# Patient Record
Sex: MALE
[De-identification: names, ages, dates, MRNs, and addresses within clinical notes are randomized per-mention and may not be internally consistent; named-entity substitution may affect disease eponyms.]

---

## 2018-02-17 ENCOUNTER — HOSPITAL (OUTPATIENT)
Dept: OTHER | Age: 58
End: 2018-02-17
Attending: INTERNAL MEDICINE

## 2018-02-17 LAB
A/G RATIO_: 0.9
ABS LYMPH: 2 K/CUMM (ref 1–3.5)
ABS MONO: 0.4 K/CUMM (ref 0.1–0.8)
ABS NEUTRO: 2.6 K/CUMM (ref 2–8)
ALBUMIN: 3.5 G/DL (ref 3.5–5)
ALK PHOS: 54 UNIT/L (ref 50–124)
ALT/GPT: 34 UNIT/L (ref 0–55)
ANION GAP SERPL CALC-SCNC: 13 MEQ/L (ref 10–20)
APTT PPP: <21 SECONDS (ref 22–30)
AST/GOT: 36 UNIT/L (ref 5–34)
BASOPHIL: 1 % (ref 0–1)
BILI TOTAL: <0.1 MG/DL (ref 0.2–1)
BUN SERPL-MCNC: 14 MG/DL (ref 6–20)
CALCIUM: 9.3 MG/DL (ref 8.4–10.2)
CHLORIDE: 110 MEQ/L (ref 97–107)
CREATININE: 1.18 MG/DL (ref 0.6–1.3)
DIFF_TYPE?: NORMAL
EOSINOPHIL: 3 % (ref 0–6)
GLOBULIN_: 3.9 G/DL (ref 2–4.1)
GLUCOSE LVL: 137 MG/DL (ref 70–99)
HCT VFR BLD CALC: 43 % (ref 36–51)
HEMOLYSIS 2+: ABNORMAL
HEMOLYSIS 4+: NORMAL
HGB BLD-MCNC: 14.3 G/DL (ref 12–17)
ICTERIC 4+: NEGATIVE
IMMATURE GRAN: 0.2 % (ref 0–0.3)
INR: 1 (ref 0.9–1.1)
INSTR WBC: 5.2 K/CUMM (ref 4–11)
LIPEMIC 3+: NEGATIVE
LYMPHOCYTE: 39 %
MCH RBC QN AUTO: 30 PG (ref 25–35)
MCHC RBC AUTO-ENTMCNC: 33 G/DL (ref 32–37)
MCV RBC AUTO: 91 FL (ref 78–97)
MONOCYTE: 8 %
NEUTROPHIL: 49 %
NRBC BLD MANUAL-RTO: 0 % (ref 0–0.2)
PLATELET: 237 K/CUMM (ref 150–450)
POTASSIUM: 4.6 MEQ/L (ref 3.5–5.1)
PROTHROMBIN TIME: 10.7 SECONDS (ref 9.7–11.6)
RBC # BLD: 4.78 M/CUMM (ref 4.2–6)
RDW: 12.5 % (ref 11.5–14.5)
SODIUM: 142 MEQ/L (ref 136–145)
TCO2: 24 MEQ/L (ref 19–29)
TOTAL PROTEIN: 7.4 G/DL (ref 6.4–8.3)
TROPONIN I: 0.05 NG/ML
WBC # BLD: 5.2 K/CUMM (ref 4–11)

## 2018-02-18 LAB
ABS LYMPH: 1.8 K/CUMM (ref 1–3.5)
ABS MONO: 1.4 K/CUMM (ref 0.1–0.8)
ABS NEUTRO: 12 K/CUMM (ref 2–8)
ANION GAP SERPL CALC-SCNC: 12 MEQ/L (ref 10–20)
BASOPHIL: 0 % (ref 0–1)
BUN SERPL-MCNC: 16 MG/DL (ref 6–20)
CALCIUM: 8.8 MG/DL (ref 8.4–10.2)
CHLORIDE: 107 MEQ/L (ref 97–107)
CREATININE: 0.83 MG/DL (ref 0.6–1.3)
DIFF_TYPE?: ABNORMAL
EOSINOPHIL: 0 % (ref 0–6)
GLUCOSE LVL: 119 MG/DL (ref 70–99)
HCT VFR BLD CALC: 41 % (ref 36–51)
HEMOLYSIS 2+: NEGATIVE
HEMOLYSIS 4+: NEGATIVE
HGB BLD-MCNC: 13.6 G/DL (ref 12–17)
ICTERIC 4+: NEGATIVE
IMMATURE GRAN: 0.4 % (ref 0–0.3)
INSTR WBC: 15.3 K/CUMM (ref 4–11)
LYMPHOCYTE: 12 %
MCH RBC QN AUTO: 29 PG (ref 25–35)
MCHC RBC AUTO-ENTMCNC: 33 G/DL (ref 32–37)
MCV RBC AUTO: 88 FL (ref 78–97)
MONOCYTE: 9 %
NEUTROPHIL: 78 %
NRBC BLD MANUAL-RTO: 0 % (ref 0–0.2)
PLATELET: 226 K/CUMM (ref 150–450)
POTASSIUM: 3.7 MEQ/L (ref 3.5–5.1)
RBC # BLD: 4.63 M/CUMM (ref 4.2–6)
RDW: 12.9 % (ref 11.5–14.5)
SODIUM: 138 MEQ/L (ref 136–145)
TCO2: 23 MEQ/L (ref 19–29)
TROPONIN I: 40.92 NG/ML
WBC # BLD: 15.3 K/CUMM (ref 4–11)

## 2018-02-19 LAB
ABS LYMPH: 2 K/CUMM (ref 1–3.5)
ABS MONO: 1.4 K/CUMM (ref 0.1–0.8)
ABS NEUTRO: 7.9 K/CUMM (ref 2–8)
BASOPHIL: 0 % (ref 0–1)
CONTROL LINE: PRESENT
DIFF_TYPE?: ABNORMAL
EOSINOPHIL: 1 % (ref 0–6)
HCT VFR BLD CALC: 40 % (ref 36–51)
HEMOLYSIS 4+: NEGATIVE
HGB BLD-MCNC: 13.5 G/DL (ref 12–17)
ICTERIC 4+: NEGATIVE
IMMATURE GRAN: 0.3 % (ref 0–0.3)
INFLUENZ A: NORMAL
INFLUENZ B: NORMAL
INFLUENZ COMMNT: NORMAL
INSTR WBC: 11.5 K/CUMM (ref 4–11)
LYMPHOCYTE: 17 %
MCH RBC QN AUTO: 30 PG (ref 25–35)
MCHC RBC AUTO-ENTMCNC: 34 G/DL (ref 32–37)
MCV RBC AUTO: 88 FL (ref 78–97)
MONOCYTE: 12 %
NEUTROPHIL: 68 %
NRBC BLD MANUAL-RTO: 0 % (ref 0–0.2)
PLATELET: 201 K/CUMM (ref 150–450)
RBC # BLD: 4.54 M/CUMM (ref 4.2–6)
RDW: 12.6 % (ref 11.5–14.5)
TROPONIN I: 25.94 NG/ML
UA APPEAR: CLEAR
UA BILI: NEGATIVE
UA BLOOD: NEGATIVE
UA COLOR: YELLOW
UA GLUCOSE: NEGATIVE
UA KETONES: NEGATIVE
UA LEUK EST: NEGATIVE
UA NITRITE: NEGATIVE
UA PH: 7 (ref 5–7)
UA PROTEIN: NEGATIVE
UA SPEC GRAV: <=1.005 (ref 1.01–1.02)
UA UROBILINOGEN: 0.2 MG/DL (ref 0.2–1)
WBC # BLD: 11.5 K/CUMM (ref 4–11)

## 2018-03-16 ENCOUNTER — HOSPITAL ENCOUNTER (OUTPATIENT)
Dept: CARDIAC REHAB | Age: 58
Setting detail: THERAPIES SERIES
Discharge: HOME | End: 2018-03-16
Payer: COMMERCIAL

## 2018-03-16 VITALS — HEIGHT: 70 IN | WEIGHT: 184.5 LBS | BODY MASS INDEX: 26.41 KG/M2

## 2018-03-16 PROCEDURE — 93798 PHYS/QHP OP CAR RHAB W/ECG: CPT

## 2018-03-16 RX ORDER — LISINOPRIL 2.5 MG/1
2.5 TABLET ORAL DAILY
COMMUNITY
End: 2019-09-24 | Stop reason: SDUPTHER

## 2018-03-16 RX ORDER — CARVEDILOL 3.12 MG/1
6.25 TABLET ORAL 2 TIMES DAILY WITH MEALS
COMMUNITY
End: 2019-09-24 | Stop reason: SDUPTHER

## 2018-03-16 ASSESSMENT — PATIENT HEALTH QUESTIONNAIRE - PHQ9: SUM OF ALL RESPONSES TO PHQ QUESTIONS 1-9: 0

## 2018-03-19 ENCOUNTER — HOSPITAL ENCOUNTER (OUTPATIENT)
Dept: CARDIAC REHAB | Age: 58
Setting detail: THERAPIES SERIES
Discharge: HOME | End: 2018-03-19
Payer: COMMERCIAL

## 2018-03-19 VITALS — BODY MASS INDEX: 26.49 KG/M2 | WEIGHT: 184.6 LBS

## 2018-03-19 PROCEDURE — 93798 PHYS/QHP OP CAR RHAB W/ECG: CPT

## 2018-03-21 ENCOUNTER — HOSPITAL ENCOUNTER (OUTPATIENT)
Dept: CARDIAC REHAB | Age: 58
Setting detail: THERAPIES SERIES
Discharge: HOME | End: 2018-03-21
Payer: COMMERCIAL

## 2018-03-21 VITALS — BODY MASS INDEX: 26.36 KG/M2 | WEIGHT: 183.7 LBS

## 2018-03-21 PROCEDURE — 93798 PHYS/QHP OP CAR RHAB W/ECG: CPT

## 2018-03-23 ENCOUNTER — HOSPITAL ENCOUNTER (OUTPATIENT)
Dept: CARDIAC REHAB | Age: 58
Setting detail: THERAPIES SERIES
Discharge: HOME | End: 2018-03-23
Payer: COMMERCIAL

## 2018-03-23 VITALS — WEIGHT: 182.8 LBS | BODY MASS INDEX: 26.23 KG/M2

## 2018-03-23 PROCEDURE — 93798 PHYS/QHP OP CAR RHAB W/ECG: CPT

## 2018-03-26 ENCOUNTER — APPOINTMENT (OUTPATIENT)
Dept: CARDIAC REHAB | Age: 58
End: 2018-03-26
Payer: COMMERCIAL

## 2018-03-28 ENCOUNTER — APPOINTMENT (OUTPATIENT)
Dept: CARDIAC REHAB | Age: 58
End: 2018-03-28
Payer: COMMERCIAL

## 2018-04-02 ENCOUNTER — HOSPITAL ENCOUNTER (OUTPATIENT)
Dept: CARDIAC REHAB | Age: 58
Setting detail: THERAPIES SERIES
Discharge: HOME | End: 2018-04-02
Payer: COMMERCIAL

## 2018-04-04 ENCOUNTER — HOSPITAL ENCOUNTER (OUTPATIENT)
Dept: CARDIAC REHAB | Age: 58
Setting detail: THERAPIES SERIES
Discharge: HOME | End: 2018-04-04
Payer: COMMERCIAL

## 2018-04-04 VITALS — WEIGHT: 183.7 LBS | BODY MASS INDEX: 26.36 KG/M2

## 2018-04-04 PROCEDURE — 93798 PHYS/QHP OP CAR RHAB W/ECG: CPT

## 2018-04-06 ENCOUNTER — HOSPITAL ENCOUNTER (OUTPATIENT)
Dept: CARDIAC REHAB | Age: 58
Setting detail: THERAPIES SERIES
Discharge: HOME | End: 2018-04-06
Payer: COMMERCIAL

## 2018-04-06 VITALS — WEIGHT: 183 LBS | BODY MASS INDEX: 26.26 KG/M2

## 2018-04-06 PROCEDURE — 93798 PHYS/QHP OP CAR RHAB W/ECG: CPT

## 2018-04-09 ENCOUNTER — HOSPITAL ENCOUNTER (OUTPATIENT)
Dept: CARDIAC REHAB | Age: 58
Setting detail: THERAPIES SERIES
Discharge: HOME | End: 2018-04-09
Payer: COMMERCIAL

## 2018-04-11 ENCOUNTER — HOSPITAL ENCOUNTER (OUTPATIENT)
Dept: CARDIAC REHAB | Age: 58
Setting detail: THERAPIES SERIES
Discharge: HOME | End: 2018-04-11
Payer: COMMERCIAL

## 2018-04-11 VITALS — BODY MASS INDEX: 26.11 KG/M2 | WEIGHT: 182 LBS

## 2018-04-11 PROCEDURE — 93798 PHYS/QHP OP CAR RHAB W/ECG: CPT

## 2018-04-13 ENCOUNTER — HOSPITAL ENCOUNTER (OUTPATIENT)
Dept: CARDIAC REHAB | Age: 58
Setting detail: THERAPIES SERIES
Discharge: HOME | End: 2018-04-13
Payer: COMMERCIAL

## 2018-04-13 VITALS — BODY MASS INDEX: 26.3 KG/M2 | WEIGHT: 183.3 LBS

## 2018-04-13 PROCEDURE — 93798 PHYS/QHP OP CAR RHAB W/ECG: CPT

## 2018-04-16 ENCOUNTER — HOSPITAL ENCOUNTER (OUTPATIENT)
Dept: CARDIAC REHAB | Age: 58
Setting detail: THERAPIES SERIES
Discharge: HOME | End: 2018-04-16
Payer: COMMERCIAL

## 2018-04-16 VITALS — WEIGHT: 183 LBS | BODY MASS INDEX: 26.2 KG/M2 | HEIGHT: 70 IN

## 2018-04-16 PROCEDURE — 93798 PHYS/QHP OP CAR RHAB W/ECG: CPT

## 2018-04-18 ENCOUNTER — HOSPITAL ENCOUNTER (OUTPATIENT)
Dept: CARDIAC REHAB | Age: 58
Setting detail: THERAPIES SERIES
Discharge: HOME | End: 2018-04-18
Payer: COMMERCIAL

## 2018-04-18 VITALS — WEIGHT: 184.6 LBS | BODY MASS INDEX: 26.49 KG/M2

## 2018-04-18 PROCEDURE — 93798 PHYS/QHP OP CAR RHAB W/ECG: CPT

## 2018-04-20 ENCOUNTER — HOSPITAL ENCOUNTER (OUTPATIENT)
Dept: CARDIAC REHAB | Age: 58
Setting detail: THERAPIES SERIES
Discharge: HOME | End: 2018-04-20
Payer: COMMERCIAL

## 2018-04-20 VITALS — WEIGHT: 182.3 LBS | BODY MASS INDEX: 26.16 KG/M2

## 2018-04-20 PROCEDURE — 93798 PHYS/QHP OP CAR RHAB W/ECG: CPT

## 2018-04-23 ENCOUNTER — HOSPITAL ENCOUNTER (OUTPATIENT)
Dept: CARDIAC REHAB | Age: 58
Setting detail: THERAPIES SERIES
Discharge: HOME | End: 2018-04-23
Payer: COMMERCIAL

## 2018-04-23 VITALS — WEIGHT: 185 LBS | BODY MASS INDEX: 26.54 KG/M2

## 2018-04-23 PROCEDURE — 93798 PHYS/QHP OP CAR RHAB W/ECG: CPT

## 2018-04-25 ENCOUNTER — HOSPITAL ENCOUNTER (OUTPATIENT)
Dept: CARDIAC REHAB | Age: 58
Setting detail: THERAPIES SERIES
Discharge: HOME | End: 2018-04-25
Payer: COMMERCIAL

## 2018-04-25 VITALS — BODY MASS INDEX: 26.36 KG/M2 | WEIGHT: 183.7 LBS

## 2018-04-25 PROCEDURE — 93798 PHYS/QHP OP CAR RHAB W/ECG: CPT

## 2018-04-27 ENCOUNTER — HOSPITAL ENCOUNTER (OUTPATIENT)
Dept: CARDIAC REHAB | Age: 58
Setting detail: THERAPIES SERIES
Discharge: HOME | End: 2018-04-27
Payer: COMMERCIAL

## 2018-04-27 VITALS — WEIGHT: 184.2 LBS | BODY MASS INDEX: 26.43 KG/M2

## 2018-04-27 PROCEDURE — 93798 PHYS/QHP OP CAR RHAB W/ECG: CPT

## 2018-04-30 ENCOUNTER — HOSPITAL ENCOUNTER (OUTPATIENT)
Dept: CARDIAC REHAB | Age: 58
Setting detail: THERAPIES SERIES
Discharge: HOME | End: 2018-04-30
Payer: COMMERCIAL

## 2018-04-30 VITALS — BODY MASS INDEX: 26.4 KG/M2 | WEIGHT: 184 LBS

## 2018-04-30 PROCEDURE — 93798 PHYS/QHP OP CAR RHAB W/ECG: CPT

## 2018-05-02 ENCOUNTER — HOSPITAL ENCOUNTER (OUTPATIENT)
Dept: CARDIAC REHAB | Age: 58
Setting detail: THERAPIES SERIES
Discharge: HOME | End: 2018-05-02
Payer: COMMERCIAL

## 2018-05-02 VITALS — WEIGHT: 179.9 LBS | BODY MASS INDEX: 25.81 KG/M2

## 2018-05-02 PROCEDURE — 93798 PHYS/QHP OP CAR RHAB W/ECG: CPT

## 2018-05-04 ENCOUNTER — HOSPITAL ENCOUNTER (OUTPATIENT)
Dept: CARDIAC REHAB | Age: 58
Setting detail: THERAPIES SERIES
Discharge: HOME | End: 2018-05-04
Payer: COMMERCIAL

## 2018-05-04 VITALS — WEIGHT: 181.8 LBS | BODY MASS INDEX: 26.09 KG/M2

## 2018-05-04 PROCEDURE — 93798 PHYS/QHP OP CAR RHAB W/ECG: CPT

## 2018-05-07 ENCOUNTER — HOSPITAL ENCOUNTER (OUTPATIENT)
Dept: CARDIAC REHAB | Age: 58
Setting detail: THERAPIES SERIES
Discharge: HOME | End: 2018-05-07
Payer: COMMERCIAL

## 2018-05-09 ENCOUNTER — HOSPITAL ENCOUNTER (OUTPATIENT)
Dept: CARDIAC REHAB | Age: 58
Setting detail: THERAPIES SERIES
Discharge: HOME | End: 2018-05-09
Payer: COMMERCIAL

## 2018-05-09 VITALS — BODY MASS INDEX: 25.9 KG/M2 | WEIGHT: 180.5 LBS

## 2018-05-09 PROCEDURE — 93798 PHYS/QHP OP CAR RHAB W/ECG: CPT

## 2018-05-11 ENCOUNTER — HOSPITAL ENCOUNTER (OUTPATIENT)
Dept: CARDIAC REHAB | Age: 58
Setting detail: THERAPIES SERIES
Discharge: HOME | End: 2018-05-11
Payer: COMMERCIAL

## 2018-05-11 VITALS — BODY MASS INDEX: 26.11 KG/M2 | WEIGHT: 182 LBS

## 2018-05-11 PROCEDURE — 93798 PHYS/QHP OP CAR RHAB W/ECG: CPT

## 2018-05-14 ENCOUNTER — HOSPITAL ENCOUNTER (OUTPATIENT)
Dept: CARDIAC REHAB | Age: 58
Setting detail: THERAPIES SERIES
Discharge: HOME | End: 2018-05-14
Payer: COMMERCIAL

## 2018-05-14 VITALS — BODY MASS INDEX: 25.88 KG/M2 | WEIGHT: 180.4 LBS

## 2018-05-14 PROCEDURE — 93798 PHYS/QHP OP CAR RHAB W/ECG: CPT

## 2018-05-16 ENCOUNTER — HOSPITAL ENCOUNTER (OUTPATIENT)
Dept: CARDIAC REHAB | Age: 58
Setting detail: THERAPIES SERIES
Discharge: HOME | End: 2018-05-16
Payer: COMMERCIAL

## 2018-05-18 ENCOUNTER — HOSPITAL ENCOUNTER (OUTPATIENT)
Dept: CARDIAC REHAB | Age: 58
Setting detail: THERAPIES SERIES
Discharge: HOME | End: 2018-05-18
Payer: COMMERCIAL

## 2018-05-18 VITALS — WEIGHT: 178.9 LBS | BODY MASS INDEX: 25.61 KG/M2 | HEIGHT: 70 IN

## 2018-05-18 PROCEDURE — 93798 PHYS/QHP OP CAR RHAB W/ECG: CPT

## 2018-05-21 ENCOUNTER — HOSPITAL ENCOUNTER (OUTPATIENT)
Dept: CARDIAC REHAB | Age: 58
Setting detail: THERAPIES SERIES
Discharge: HOME | End: 2018-05-21
Payer: COMMERCIAL

## 2018-05-21 VITALS — WEIGHT: 179.8 LBS | BODY MASS INDEX: 25.8 KG/M2

## 2018-05-21 PROCEDURE — 93798 PHYS/QHP OP CAR RHAB W/ECG: CPT

## 2018-05-23 ENCOUNTER — HOSPITAL ENCOUNTER (OUTPATIENT)
Dept: CARDIAC REHAB | Age: 58
Setting detail: THERAPIES SERIES
Discharge: HOME | End: 2018-05-23
Payer: COMMERCIAL

## 2018-05-23 VITALS — WEIGHT: 177.5 LBS | BODY MASS INDEX: 25.47 KG/M2

## 2018-05-23 PROCEDURE — 93798 PHYS/QHP OP CAR RHAB W/ECG: CPT

## 2018-05-25 ENCOUNTER — HOSPITAL ENCOUNTER (OUTPATIENT)
Dept: CARDIAC REHAB | Age: 58
Setting detail: THERAPIES SERIES
Discharge: HOME | End: 2018-05-25
Payer: COMMERCIAL

## 2018-05-25 VITALS — BODY MASS INDEX: 25.78 KG/M2 | WEIGHT: 179.7 LBS

## 2018-05-25 PROCEDURE — 93798 PHYS/QHP OP CAR RHAB W/ECG: CPT

## 2018-05-30 ENCOUNTER — HOSPITAL ENCOUNTER (OUTPATIENT)
Dept: CARDIAC REHAB | Age: 58
Setting detail: THERAPIES SERIES
Discharge: HOME | End: 2018-05-30
Payer: COMMERCIAL

## 2018-05-30 VITALS — BODY MASS INDEX: 25.44 KG/M2 | WEIGHT: 177.3 LBS

## 2018-05-30 PROCEDURE — 93798 PHYS/QHP OP CAR RHAB W/ECG: CPT

## 2018-06-01 ENCOUNTER — HOSPITAL ENCOUNTER (OUTPATIENT)
Dept: CARDIAC REHAB | Age: 58
Setting detail: THERAPIES SERIES
Discharge: HOME | End: 2018-06-01
Payer: COMMERCIAL

## 2018-06-01 PROCEDURE — 93798 PHYS/QHP OP CAR RHAB W/ECG: CPT

## 2018-06-04 ENCOUNTER — APPOINTMENT (OUTPATIENT)
Dept: CARDIAC REHAB | Age: 58
End: 2018-06-04
Payer: COMMERCIAL

## 2018-06-06 ENCOUNTER — HOSPITAL ENCOUNTER (OUTPATIENT)
Dept: CARDIAC REHAB | Age: 58
Setting detail: THERAPIES SERIES
Discharge: HOME | End: 2018-06-06
Payer: COMMERCIAL

## 2018-06-08 ENCOUNTER — HOSPITAL ENCOUNTER (OUTPATIENT)
Dept: CARDIAC REHAB | Age: 58
Setting detail: THERAPIES SERIES
Discharge: HOME | End: 2018-06-08
Payer: COMMERCIAL

## 2018-06-08 VITALS — BODY MASS INDEX: 25.35 KG/M2 | WEIGHT: 177.1 LBS | HEIGHT: 70 IN

## 2018-06-11 ENCOUNTER — APPOINTMENT (OUTPATIENT)
Dept: CARDIAC REHAB | Age: 58
End: 2018-06-11
Payer: COMMERCIAL

## 2018-06-13 ENCOUNTER — APPOINTMENT (OUTPATIENT)
Dept: CARDIAC REHAB | Age: 58
End: 2018-06-13
Payer: COMMERCIAL

## 2018-06-15 ENCOUNTER — APPOINTMENT (OUTPATIENT)
Dept: CARDIAC REHAB | Age: 58
End: 2018-06-15
Payer: COMMERCIAL

## 2018-06-18 ENCOUNTER — APPOINTMENT (OUTPATIENT)
Dept: CARDIAC REHAB | Age: 58
End: 2018-06-18
Payer: COMMERCIAL

## 2018-06-20 ENCOUNTER — APPOINTMENT (OUTPATIENT)
Dept: CARDIAC REHAB | Age: 58
End: 2018-06-20
Payer: COMMERCIAL

## 2018-06-22 ENCOUNTER — APPOINTMENT (OUTPATIENT)
Dept: CARDIAC REHAB | Age: 58
End: 2018-06-22
Payer: COMMERCIAL

## 2018-06-25 ENCOUNTER — APPOINTMENT (OUTPATIENT)
Dept: CARDIAC REHAB | Age: 58
End: 2018-06-25
Payer: COMMERCIAL

## 2018-06-27 ENCOUNTER — APPOINTMENT (OUTPATIENT)
Dept: CARDIAC REHAB | Age: 58
End: 2018-06-27
Payer: COMMERCIAL

## 2018-06-29 ENCOUNTER — APPOINTMENT (OUTPATIENT)
Dept: CARDIAC REHAB | Age: 58
End: 2018-06-29
Payer: COMMERCIAL

## 2019-06-24 PROBLEM — I25.2 HISTORY OF HEART ATTACK: Status: ACTIVE | Noted: 2019-06-24

## 2019-06-24 PROBLEM — M10.00 IDIOPATHIC GOUT: Status: ACTIVE | Noted: 2019-06-24

## 2019-06-24 PROBLEM — E78.2 MIXED HYPERLIPIDEMIA: Status: ACTIVE | Noted: 2019-06-24

## 2019-06-24 PROBLEM — I10 ESSENTIAL HYPERTENSION: Status: ACTIVE | Noted: 2019-06-24

## 2019-06-24 PROBLEM — N40.0 BPH WITHOUT URINARY OBSTRUCTION: Status: ACTIVE | Noted: 2019-06-24

## 2019-06-24 PROBLEM — E55.9 VITAMIN D DEFICIENCY: Status: ACTIVE | Noted: 2019-06-24

## 2019-08-11 ENCOUNTER — NURSE TRIAGE (OUTPATIENT)
Dept: OTHER | Facility: CLINIC | Age: 59
End: 2019-08-11

## 2019-11-06 ENCOUNTER — HOSPITAL ENCOUNTER (OUTPATIENT)
Age: 59
Setting detail: SPECIMEN
Discharge: HOME | End: 2019-11-06
Payer: COMMERCIAL

## 2019-11-06 DIAGNOSIS — N42.9 PROSTATE DISORDER: ICD-10-CM

## 2019-11-06 DIAGNOSIS — R53.83 OTHER FATIGUE: ICD-10-CM

## 2019-11-06 LAB
ABSOLUTE EOS #: 0.15 K/UL (ref 0–0.44)
ABSOLUTE IMMATURE GRANULOCYTE: 0.03 K/UL (ref 0–0.3)
ABSOLUTE LYMPH #: 1.96 K/UL (ref 1.1–3.7)
ABSOLUTE MONO #: 0.55 K/UL (ref 0.1–1.2)
ALBUMIN SERPL-MCNC: 4.4 G/DL (ref 3.5–5.2)
ALBUMIN/GLOBULIN RATIO: 1.3 (ref 1–2.5)
ALP BLD-CCNC: 94 U/L (ref 40–129)
ALT SERPL-CCNC: 25 U/L (ref 5–41)
ANION GAP SERPL CALCULATED.3IONS-SCNC: 11 MMOL/L (ref 9–17)
AST SERPL-CCNC: 21 U/L
BASOPHILS # BLD: 1 % (ref 0–2)
BASOPHILS ABSOLUTE: 0.04 K/UL (ref 0–0.2)
BILIRUB SERPL-MCNC: 0.83 MG/DL (ref 0.3–1.2)
BUN BLDV-MCNC: 12 MG/DL (ref 6–20)
BUN/CREAT BLD: ABNORMAL (ref 9–20)
CALCIUM SERPL-MCNC: 9.7 MG/DL (ref 8.6–10.4)
CHLORIDE BLD-SCNC: 102 MMOL/L (ref 98–107)
CO2: 27 MMOL/L (ref 20–31)
CREAT SERPL-MCNC: 0.65 MG/DL (ref 0.7–1.2)
DIFFERENTIAL TYPE: ABNORMAL
EOSINOPHILS RELATIVE PERCENT: 2 % (ref 1–4)
GFR AFRICAN AMERICAN: >60 ML/MIN
GFR NON-AFRICAN AMERICAN: >60 ML/MIN
GFR SERPL CREATININE-BSD FRML MDRD: ABNORMAL ML/MIN/{1.73_M2}
GFR SERPL CREATININE-BSD FRML MDRD: ABNORMAL ML/MIN/{1.73_M2}
GLUCOSE FASTING: 96 MG/DL (ref 70–99)
HCT VFR BLD CALC: 51.2 % (ref 40.7–50.3)
HEMOGLOBIN: 16.7 G/DL (ref 13–17)
IMMATURE GRANULOCYTES: 1 %
LYMPHOCYTES # BLD: 30 % (ref 24–43)
MCH RBC QN AUTO: 30.3 PG (ref 25.2–33.5)
MCHC RBC AUTO-ENTMCNC: 32.6 G/DL (ref 28.4–34.8)
MCV RBC AUTO: 92.9 FL (ref 82.6–102.9)
MONOCYTES # BLD: 9 % (ref 3–12)
NRBC AUTOMATED: 0 PER 100 WBC
PDW BLD-RTO: 12.3 % (ref 11.8–14.4)
PLATELET # BLD: 235 K/UL (ref 138–453)
PLATELET ESTIMATE: ABNORMAL
PMV BLD AUTO: 10.8 FL (ref 8.1–13.5)
POTASSIUM SERPL-SCNC: 4.5 MMOL/L (ref 3.7–5.3)
PROSTATE SPECIFIC ANTIGEN: 0.8 UG/L
RBC # BLD: 5.51 M/UL (ref 4.21–5.77)
RBC # BLD: ABNORMAL 10*6/UL
SEG NEUTROPHILS: 58 % (ref 36–65)
SEGMENTED NEUTROPHILS ABSOLUTE COUNT: 3.73 K/UL (ref 1.5–8.1)
SODIUM BLD-SCNC: 140 MMOL/L (ref 135–144)
TOTAL PROTEIN: 7.7 G/DL (ref 6.4–8.3)
TSH SERPL DL<=0.05 MIU/L-ACNC: 1.23 MIU/L (ref 0.3–5)
VITAMIN B-12: 522 PG/ML (ref 232–1245)
VITAMIN D 25-HYDROXY: 51.3 NG/ML (ref 30–100)
WBC # BLD: 6.5 K/UL (ref 3.5–11.3)
WBC # BLD: ABNORMAL 10*3/UL

## 2020-08-20 ENCOUNTER — HOSPITAL ENCOUNTER (OUTPATIENT)
Age: 60
Setting detail: SPECIMEN
Discharge: HOME | End: 2020-08-20
Payer: COMMERCIAL

## 2020-08-20 LAB
CHOLESTEROL/HDL RATIO: 3.5
CHOLESTEROL: 136 MG/DL
HDLC SERPL-MCNC: 39 MG/DL
LDL CHOLESTEROL: 57 MG/DL (ref 0–130)
TRIGL SERPL-MCNC: 198 MG/DL
VLDLC SERPL CALC-MCNC: ABNORMAL MG/DL (ref 1–30)

## 2020-10-02 ENCOUNTER — HOSPITAL ENCOUNTER (OUTPATIENT)
Dept: PREADMISSION TESTING | Age: 60
Discharge: HOME | End: 2020-10-06
Payer: COMMERCIAL

## 2020-10-02 PROCEDURE — U0003 INFECTIOUS AGENT DETECTION BY NUCLEIC ACID (DNA OR RNA); SEVERE ACUTE RESPIRATORY SYNDROME CORONAVIRUS 2 (SARS-COV-2) (CORONAVIRUS DISEASE [COVID-19]), AMPLIFIED PROBE TECHNIQUE, MAKING USE OF HIGH THROUGHPUT TECHNOLOGIES AS DESCRIBED BY CMS-2020-01-R: HCPCS

## 2020-10-04 LAB — SARS-COV-2, NAA: NOT DETECTED

## 2020-10-06 ENCOUNTER — HOSPITAL ENCOUNTER (OUTPATIENT)
Dept: SLEEP CENTER | Age: 60
Discharge: HOME | End: 2020-10-08
Payer: COMMERCIAL

## 2020-10-06 PROCEDURE — 95810 POLYSOM 6/> YRS 4/> PARAM: CPT

## 2020-10-07 VITALS
TEMPERATURE: 96.4 F | BODY MASS INDEX: 26.63 KG/M2 | WEIGHT: 186 LBS | RESPIRATION RATE: 16 BRPM | HEART RATE: 56 BPM | HEIGHT: 70 IN

## 2020-10-27 LAB — STATUS: NORMAL

## 2020-10-29 ENCOUNTER — HOSPITAL ENCOUNTER (OUTPATIENT)
Dept: PREADMISSION TESTING | Age: 60
Discharge: HOME | End: 2020-11-02
Payer: COMMERCIAL

## 2020-10-29 PROCEDURE — U0003 INFECTIOUS AGENT DETECTION BY NUCLEIC ACID (DNA OR RNA); SEVERE ACUTE RESPIRATORY SYNDROME CORONAVIRUS 2 (SARS-COV-2) (CORONAVIRUS DISEASE [COVID-19]), AMPLIFIED PROBE TECHNIQUE, MAKING USE OF HIGH THROUGHPUT TECHNOLOGIES AS DESCRIBED BY CMS-2020-01-R: HCPCS

## 2020-10-31 LAB — SARS-COV-2, NAA: NOT DETECTED

## 2020-11-01 ENCOUNTER — HOSPITAL ENCOUNTER (OUTPATIENT)
Dept: SLEEP CENTER | Age: 60
Discharge: HOME | End: 2020-11-03
Payer: COMMERCIAL

## 2020-11-01 PROCEDURE — 95811 POLYSOM 6/>YRS CPAP 4/> PARM: CPT

## 2020-11-02 VITALS
OXYGEN SATURATION: 95 % | BODY MASS INDEX: 26.63 KG/M2 | WEIGHT: 186 LBS | TEMPERATURE: 98.3 F | RESPIRATION RATE: 16 BRPM | HEART RATE: 55 BPM | HEIGHT: 70 IN

## 2020-11-11 LAB — STATUS: NORMAL

## 2021-02-09 ENCOUNTER — HOSPITAL ENCOUNTER (OUTPATIENT)
Age: 61
Setting detail: SPECIMEN
Discharge: HOME | End: 2021-02-09
Payer: COMMERCIAL

## 2021-02-09 DIAGNOSIS — N42.9 PROSTATE DISORDER: ICD-10-CM

## 2021-02-09 DIAGNOSIS — R53.83 OTHER FATIGUE: ICD-10-CM

## 2021-02-09 DIAGNOSIS — M10.00 IDIOPATHIC GOUT, UNSPECIFIED CHRONICITY, UNSPECIFIED SITE: ICD-10-CM

## 2021-02-09 DIAGNOSIS — E78.2 MIXED HYPERLIPIDEMIA: ICD-10-CM

## 2021-02-09 DIAGNOSIS — R73.01 IFG (IMPAIRED FASTING GLUCOSE): ICD-10-CM

## 2021-02-09 LAB
ABSOLUTE EOS #: 0.14 K/UL (ref 0–0.44)
ABSOLUTE IMMATURE GRANULOCYTE: 0.04 K/UL (ref 0–0.3)
ABSOLUTE LYMPH #: 1.98 K/UL (ref 1.1–3.7)
ABSOLUTE MONO #: 0.72 K/UL (ref 0.1–1.2)
ALBUMIN SERPL-MCNC: 4.6 G/DL (ref 3.5–5.2)
ALBUMIN/GLOBULIN RATIO: 1.3 (ref 1–2.5)
ALP BLD-CCNC: 104 U/L (ref 40–129)
ALT SERPL-CCNC: 22 U/L (ref 5–41)
ANION GAP SERPL CALCULATED.3IONS-SCNC: 15 MMOL/L (ref 9–17)
AST SERPL-CCNC: 23 U/L
BASOPHILS # BLD: 0 % (ref 0–2)
BASOPHILS ABSOLUTE: <0.03 K/UL (ref 0–0.2)
BILIRUB SERPL-MCNC: 0.93 MG/DL (ref 0.3–1.2)
BUN BLDV-MCNC: 13 MG/DL (ref 6–20)
BUN/CREAT BLD: NORMAL (ref 9–20)
CALCIUM SERPL-MCNC: 9.5 MG/DL (ref 8.6–10.4)
CHLORIDE BLD-SCNC: 104 MMOL/L (ref 98–107)
CHOLESTEROL/HDL RATIO: 3.4
CHOLESTEROL: 154 MG/DL
CO2: 23 MMOL/L (ref 20–31)
CREAT SERPL-MCNC: 0.7 MG/DL (ref 0.7–1.2)
DIFFERENTIAL TYPE: ABNORMAL
EOSINOPHILS RELATIVE PERCENT: 2 % (ref 1–4)
ESTIMATED AVERAGE GLUCOSE: 105 MG/DL
GFR AFRICAN AMERICAN: >60 ML/MIN
GFR NON-AFRICAN AMERICAN: >60 ML/MIN
GFR SERPL CREATININE-BSD FRML MDRD: NORMAL ML/MIN/{1.73_M2}
GFR SERPL CREATININE-BSD FRML MDRD: NORMAL ML/MIN/{1.73_M2}
GLUCOSE FASTING: 90 MG/DL (ref 70–99)
HBA1C MFR BLD: 5.3 % (ref 4–6)
HCT VFR BLD CALC: 52.3 % (ref 40.7–50.3)
HDLC SERPL-MCNC: 45 MG/DL
HEMOGLOBIN: 16.5 G/DL (ref 13–17)
IMMATURE GRANULOCYTES: 1 %
LDL CHOLESTEROL: 65 MG/DL (ref 0–130)
LYMPHOCYTES # BLD: 28 % (ref 24–43)
MCH RBC QN AUTO: 29 PG (ref 25.2–33.5)
MCHC RBC AUTO-ENTMCNC: 31.5 G/DL (ref 28.4–34.8)
MCV RBC AUTO: 92.1 FL (ref 82.6–102.9)
MONOCYTES # BLD: 10 % (ref 3–12)
NRBC AUTOMATED: 0 PER 100 WBC
PDW BLD-RTO: 12.2 % (ref 11.8–14.4)
PLATELET # BLD: 248 K/UL (ref 138–453)
PLATELET ESTIMATE: ABNORMAL
PMV BLD AUTO: 11.1 FL (ref 8.1–13.5)
POTASSIUM SERPL-SCNC: 4.4 MMOL/L (ref 3.7–5.3)
PROSTATE SPECIFIC ANTIGEN: 0.84 UG/L
RBC # BLD: 5.68 M/UL (ref 4.21–5.77)
RBC # BLD: ABNORMAL 10*6/UL
SEG NEUTROPHILS: 59 % (ref 36–65)
SEGMENTED NEUTROPHILS ABSOLUTE COUNT: 4.14 K/UL (ref 1.5–8.1)
SODIUM BLD-SCNC: 142 MMOL/L (ref 135–144)
TOTAL PROTEIN: 8.1 G/DL (ref 6.4–8.3)
TRIGL SERPL-MCNC: 222 MG/DL
URIC ACID: 5.7 MG/DL (ref 3.4–7)
VITAMIN B-12: 503 PG/ML (ref 232–1245)
VITAMIN D 25-HYDROXY: 44 NG/ML (ref 30–100)
VLDLC SERPL CALC-MCNC: ABNORMAL MG/DL (ref 1–30)
WBC # BLD: 7 K/UL (ref 3.5–11.3)
WBC # BLD: ABNORMAL 10*3/UL

## 2021-03-25 ENCOUNTER — HOSPITAL ENCOUNTER (OUTPATIENT)
Dept: ULTRASOUND IMAGING | Facility: CLINIC | Age: 61
Discharge: HOME | End: 2021-03-27
Payer: COMMERCIAL

## 2021-03-25 DIAGNOSIS — N49.9: ICD-10-CM

## 2021-03-25 PROCEDURE — 93976 VASCULAR STUDY: CPT

## 2021-04-05 ENCOUNTER — HOSPITAL ENCOUNTER (OUTPATIENT)
Dept: GENERAL RADIOLOGY | Facility: CLINIC | Age: 61
Discharge: HOME | End: 2021-04-07
Payer: COMMERCIAL

## 2021-04-05 DIAGNOSIS — M54.10 BACK PAIN WITH RADICULOPATHY: ICD-10-CM

## 2021-04-05 PROCEDURE — 72100 X-RAY EXAM L-S SPINE 2/3 VWS: CPT

## 2021-05-10 ENCOUNTER — HOSPITAL ENCOUNTER (OUTPATIENT)
Dept: CARDIAC CATH/INVASIVE PROCEDURES | Age: 61
Discharge: HOME | End: 2021-05-10
Payer: COMMERCIAL

## 2021-05-10 VITALS
WEIGHT: 194 LBS | HEART RATE: 66 BPM | BODY MASS INDEX: 27.77 KG/M2 | OXYGEN SATURATION: 98 % | HEIGHT: 70 IN | RESPIRATION RATE: 13 BRPM | SYSTOLIC BLOOD PRESSURE: 109 MMHG | DIASTOLIC BLOOD PRESSURE: 77 MMHG | TEMPERATURE: 97.9 F

## 2021-05-10 LAB
GFR NON-AFRICAN AMERICAN: >60 ML/MIN
GFR SERPL CREATININE-BSD FRML MDRD: >60 ML/MIN
GFR SERPL CREATININE-BSD FRML MDRD: NORMAL ML/MIN/{1.73_M2}
GLUCOSE BLD-MCNC: 94 MG/DL (ref 74–100)
PLATELET # BLD: 196 K/UL (ref 138–453)
POC BUN: 13 MG/DL (ref 8–26)
POC CHLORIDE: 105 MMOL/L (ref 98–107)
POC CREATININE: 0.87 MG/DL (ref 0.51–1.19)
POC HEMATOCRIT: 48 % (ref 41–53)
POC HEMOGLOBIN: 16.4 G/DL (ref 13.5–17.5)
POC POTASSIUM: 4 MMOL/L (ref 3.5–4.5)
POC SODIUM: 142 MMOL/L (ref 138–146)

## 2021-05-10 PROCEDURE — 6360000002 HC RX W HCPCS

## 2021-05-10 PROCEDURE — 85049 AUTOMATED PLATELET COUNT: CPT

## 2021-05-10 PROCEDURE — 82565 ASSAY OF CREATININE: CPT

## 2021-05-10 PROCEDURE — C1887 CATHETER, GUIDING: HCPCS

## 2021-05-10 PROCEDURE — 7100000001 HC PACU RECOVERY - ADDTL 15 MIN

## 2021-05-10 PROCEDURE — 6360000004 HC RX CONTRAST MEDICATION

## 2021-05-10 PROCEDURE — 82435 ASSAY OF BLOOD CHLORIDE: CPT

## 2021-05-10 PROCEDURE — 7100000000 HC PACU RECOVERY - FIRST 15 MIN

## 2021-05-10 PROCEDURE — 82947 ASSAY GLUCOSE BLOOD QUANT: CPT

## 2021-05-10 PROCEDURE — 85014 HEMATOCRIT: CPT

## 2021-05-10 PROCEDURE — 84132 ASSAY OF SERUM POTASSIUM: CPT

## 2021-05-10 PROCEDURE — 93454 CORONARY ARTERY ANGIO S&I: CPT | Performed by: INTERNAL MEDICINE

## 2021-05-10 PROCEDURE — 2500000003 HC RX 250 WO HCPCS

## 2021-05-10 PROCEDURE — 84295 ASSAY OF SERUM SODIUM: CPT

## 2021-05-10 PROCEDURE — 2580000003 HC RX 258: Performed by: INTERNAL MEDICINE

## 2021-05-10 PROCEDURE — C1769 GUIDE WIRE: HCPCS

## 2021-05-10 PROCEDURE — 2709999900 HC NON-CHARGEABLE SUPPLY

## 2021-05-10 PROCEDURE — C1894 INTRO/SHEATH, NON-LASER: HCPCS

## 2021-05-10 PROCEDURE — 84520 ASSAY OF UREA NITROGEN: CPT

## 2021-05-10 RX ORDER — SODIUM CHLORIDE 0.9 % (FLUSH) 0.9 %
5-40 SYRINGE (ML) INJECTION EVERY 12 HOURS SCHEDULED
Status: DISCONTINUED | OUTPATIENT
Start: 2021-05-10 | End: 2021-05-11 | Stop reason: HOSPADM

## 2021-05-10 RX ORDER — ACETAMINOPHEN 325 MG/1
650 TABLET ORAL EVERY 4 HOURS PRN
Status: DISCONTINUED | OUTPATIENT
Start: 2021-05-10 | End: 2021-05-11 | Stop reason: HOSPADM

## 2021-05-10 RX ORDER — SODIUM CHLORIDE 9 MG/ML
25 INJECTION, SOLUTION INTRAVENOUS PRN
Status: DISCONTINUED | OUTPATIENT
Start: 2021-05-10 | End: 2021-05-11 | Stop reason: HOSPADM

## 2021-05-10 RX ORDER — SODIUM CHLORIDE 0.9 % (FLUSH) 0.9 %
5-40 SYRINGE (ML) INJECTION PRN
Status: DISCONTINUED | OUTPATIENT
Start: 2021-05-10 | End: 2021-05-11 | Stop reason: HOSPADM

## 2021-05-10 RX ORDER — SODIUM CHLORIDE 9 MG/ML
INJECTION, SOLUTION INTRAVENOUS CONTINUOUS
Status: DISCONTINUED | OUTPATIENT
Start: 2021-05-10 | End: 2021-05-11 | Stop reason: HOSPADM

## 2021-05-10 RX ADMIN — SODIUM CHLORIDE: 9 INJECTION, SOLUTION INTRAVENOUS at 09:04

## 2021-05-10 NOTE — H&P
[facility] Cardiology Cardiology    Consult / H&P               Today's Date: 5/10/2021  Patient Name: Lyndel Klinefelter  Date of admission: 5/10/2021  8:31 AM  Patient's age: 62 y.o., 1960  Admission Dx: Abnormal stress ECG [R94.39]    Reason for Admission / Consult: Abnormal stress test    Requesting Physician: No admitting provider for patient encounter. CHIEF COMPLAINT:  Chest discomfort and shortness of breath on exertion    History Obtained From:  patient,     HISTORY OF PRESENT ILLNESS:      The patient is a 62 y.o.  male with a history of CAD s/p TRISH to LAD in 2018, hypertension and hyperlipidemia who presents for elective cardiac cath. Patient was seen in clinic in March and reported occasional chest discomfort and shortness of breath on exertion. He also reported limitation in his activity levels due to Covid infection in the months preceding the clinic visit. A stress test was ordered at that visit which showed small to moderate apical infarction without any ischemia. Due to his recurrent symptoms, patient was scheduled for cardiac cath    Past Medical History:   has a past medical history of CAD (coronary artery disease), Cancer ([facility] [address] Enlarged prostate, Family history of heart attack, Gout, Heart attack ([facility] [address] High triglycerides, History of heart attack, Hyperlipidemia, Hypertension, and Sleep apnea. Past Surgical History:   has a past surgical history that includes Shoulder arthroscopy (Left, 1995 ?); Inguinal hernia repair (Bilateral, '85, '93); Knee arthroscopy (Right, 2012); Septoplasty (Bilateral, 01/16/2017); turbinate resection (Bilateral, 01/16/2017); Knee cartilage surgery (Left); shoulder surgery (Left); Inguinal hernia repair (Bilateral); Coronary angioplasty (2018); Cardiac catheterization (05/10/2021); and skin biopsy (Right). Home Medications:    Prior to Admission medications    Medication Sig Start Date End Date Taking?  Authorizing Provider   allopurinol (ZYLOPRIM) 100 MG tablet TAKE TWO TABLETS BY MOUTH EVERY DAY 4/3/21  Yes Arlene Schreiber MD   tamsulosin (FLOMAX) 0.4 MG capsule TAKE 1 CAPSULE BY MOUTH ONE TIME A DAY  Patient taking differently: Take 0.8 mg by mouth daily  3/8/21  Yes Arlene Schreiber MD   ticagrelor (BRILINTA) 60 MG TABS tablet Take 1 tablet by mouth 2 times daily 9/24/19  Yes Arlene Schreiber MD   lisinopril (PRINIVIL;ZESTRIL) 2.5 MG tablet Take 1.25 mg by mouth daily   Yes Historical Provider, MD   atorvastatin (LIPITOR) 80 MG tablet Take 40 mg by mouth daily   Yes Historical Provider, MD   carvedilol (COREG) 3.125 MG tablet Take 3.125 mg by mouth 2 times daily (with meals)   Yes Historical Provider, MD   Cholecalciferol (VITAMIN D3) 5000 units TABS Take by mouth   Yes Historical Provider, MD   vitamin C (ASCORBIC ACID) 500 MG tablet Take 500 mg by mouth daily   Yes Historical Provider, MD        Current Facility-Administered Medications: 0.9 % sodium chloride infusion, , Intravenous, Continuous    Allergies:  Morphine and Morphine    Social History:   reports that he has never smoked. He has never used smokeless tobacco. He reports current alcohol use. He reports that he does not use drugs. Family History: family history includes Cancer in his father and mother; Heart Attack in his father, mother, sister, and sister; Heart Disease in his brother, father, mother, sister, and sister; No Known Problems in his maternal grandfather, maternal grandmother, paternal grandfather, and paternal grandmother; Other in his maternal uncle and mother; Prostate Cancer in his brother and brother; Stroke in his father and sister; Uterine Cancer in his sister and sister. No h/o sudden cardiac death. No for premature CAD    REVIEW OF SYSTEMS:    · Constitutional: there has been no unanticipated weight loss. There's been No change in energy level, No change in activity level. · Eyes: No visual changes or diplopia. No scleral icterus.   · ENT: No Headaches  · Cardiovascular: Remaining as above  · Respiratory: No previous pulmonary problems, No cough  · Gastrointestinal: No abdominal pain. No change in bowel or bladder habits. · Genitourinary: No dysuria, trouble voiding, or hematuria. · Musculoskeletal:  No gait disturbance, No weakness or joint complaints. · Integumentary: No rash or pruritis. · Neurological: No headache, diplopia, change in muscle strength, numbness or tingling. No change in gait, balance, coordination, mood, affect, memory, mentation, behavior. · Psychiatric: No anxiety, or depression. · Endocrine: No temperature intolerance. No excessive thirst, fluid intake, or urination. No tremor. · Hematologic/Lymphatic: No abnormal bruising or bleeding, blood clots or swollen lymph nodes. · Allergic/Immunologic: No nasal congestion or hives. PHYSICAL EXAM:      /83   Pulse 62   Temp 97.9 °F (36.6 °C) (Oral)   Resp 19   Ht 5' 10\" (1.778 m)   Wt 194 lb (88 kg)   SpO2 97%   BMI 27.84 kg/m²    No intake or output data in the 24 hours ending 05/10/21 0903      Constitutional and General Appearance: alert, cooperative, no distress and appears stated age  HEENT: PERRL, no cervical lymphadenopathy. No masses palpable. Normal oral mucosa  Respiratory:  · Normal excursion and expansion without use of accessory muscles  · Resp Auscultation: Good respiratory effort. No for increased work of breathing. On auscultation: clear to auscultation bilaterally  Cardiovascular:  · The apical impulse is not displaced  · Heart tones are crisp and normal. regular S1 and S2.  · Jugular venous pulsation Normal  · The carotid upstroke is normal in amplitude and contour without delay or bruit  · Peripheral pulses are symmetrical and full     Abdomen:   · No masses or tenderness  · Bowel sounds present  Extremities:  ·  No Cyanosis or Clubbing  ·  Lower extremity edema: No  ·  Skin: Warm and dry  Neurological:  · Alert and oriented.   · Moves all extremities well  · No abnormalities of mood, affect, memory, mentation, or behavior are noted    DATA:    Diagnostics:      Stress Test: 3/4/21:  1. Apical small to moderate non transmural infarction  2. No ischemia  3. Reduced LV function with EF 48%       Cardiac Angiography: 2/22/18:   3.5 x 18 mm stent in proximal LAD, postdilated with 3.5 x 15 mm NC trek      Impression:  1. Successful PTCA stenting of the proximal and proximal/mid LAD with 2 drug-eluting stents  2. Reduced LVEF of approximately 35%,      Carotid study 4/5/2021:  1. Mild <50% stenosis of internal carotid arteries bilaterally. 2. Bilateral antegrade vertebral artery flow      MUGA scan: 2019  LVEF 56%  Labs:     CBC: No results for input(s): WBC, HGB, HCT, PLT in the last 72 hours. BMP: No results for input(s): NA, K, CO2, BUN, CREATININE, LABGLOM, GLUCOSE in the last 72 hours. Pro-BNP:  No results for input(s): PROBNP in the last 72 hours. BNP: No results for input(s): BNP in the last 72 hours. PT/INR: No results for input(s): PROTIME, INR in the last 72 hours. APTT:No results for input(s): APTT in the last 72 hours. CARDIAC ENZYMES:No results for input(s): CKTOTAL, CKMB, CKMBINDEX, TROPONINI in the last 72 hours. Invalid input(s):  TROPONINT  No results for input(s): TROPONINT in the last 72 hours. No results for input(s): TROPHS in the last 72 hours. FASTING LIPID PANEL:  Lab Results   Component Value Date    HDL 45 02/09/2021    LDLCALC 72 09/24/2019    TRIG 222 02/09/2021     LIVER PROFILE:No results for input(s): AST, ALT, LABALBU in the last 72 hours. Patient's Active Problem List  Active Problems:    * No active hospital problems. *  Resolved Problems:    * No resolved hospital problems. *        IMPRESSION:    1. Recurrent chest discomfort with recent stress test: Apical small to moderate non transmural infarction  2. S/P Acute MI , anterior wall Feb 17, 2018.  [place] heart cath 100% LAd, 50% RCA, required TRISH.  3. Previous Ischemic cardiomyopathy,EF 35% with recovered LVEF 56% in 2019  4. Dyslipidemia  5. Strong FH of CAD. Carolina Love RECOMMENDATIONS:  1. With cardiac cath  2. Further recommendations postprocedure      Pre Procedure Conscious Sedation Data:     ASA Class:                  [] I [] II [x] III [] IV     Mallampati Class:       [] I [] II [x] III [] IV              Discussed with patient and Nurse. Caterina Sotomayor M.D. Fellow, [facility]          I interviewed the patient personally, and examined by me during the visit. I have reviewed the H&P/Consult / Progress note as completed, and made appropriate changes to the patient exam and treatment plans.       I have reviewed the case with resident / fellow    Patient treatment plan was explained to patient, correction in notes was made as appropriate, and discussed final arrangement based on  my evaluation and exam.    Additional Recommendations:      Eliud Back MD  [facility]

## 2021-05-10 NOTE — PROGRESS NOTES
Patient received post cath to [facility] 4. Assessment obtained. Post cath pathway initiated. Right radial site with VASC band intact. No hematoma noted. Restrictions reviewed with patient and wife. Patient without complaints. Served lunch.

## 2021-05-10 NOTE — PROGRESS NOTES
All discharge instructions reviewed, questions answered, paper signed and given copy. Patient discharged per Ambulatory with Wife and belongings.

## 2021-05-10 NOTE — PROGRESS NOTES
Vasc band totally deflated and removed. No bleeding or hematoma. Pressure dressing applied followed by arm board. Ambulated in halls and to bathroom. Gait steady.

## 2021-05-10 NOTE — OP NOTE
[facility]        Date:   5/10/2021  Patient name:  Patrick Topete  Date of admission:  5/10/2021  8:31 AM  MRN:   [id number]  YOB: 1960    CARDIAC CATHETERIZATION    Operators:  Vianey Reyes MD    CV Fellow: Trudi Barney M.D. Procedure performed:       [x] Left Heart Catheterization. [] Graft Angiography. [x] Left Ventriculography. [] Right Heart Catheterization. [x] Coronary Angiography. [] Aortic Valve Studies. [] PCI:      [] Other:       Pre Procedure Conscious Sedation Data:    ASA Class:    [] I [] II [x] III [] IV    Mallampati Class:  [] I [] II [x] III [] IV      Indication:  [] STEMI      [x] + Stress test  [] ACS      [] + EKG Changes  [] Non Q MI       [] Significant Risk Factors  [x] Recurrent Angina             [] Diabetes Mellitus    [] New LBBB      [] Uncontrolled HTN. [] CHF / Low EF changes     [] Abnormal CTA / Ca Score  [] Other:     Procedure:  Access:  [] Femoral artery  [x] Radial  artery       [x] Right   [] Left    Procedure: After informed consent was obtained with explanation of the risks and benefits, patient was brought to the cath lab. The access area was prepped and draped in sterile fashion. 1% lidocaine was used for local block. The artery was cannulated with 6  Fr sheath with brisk arterial blood return. The side port was frequently flushed and aspirated with normal saline. Estimated blood loss: 10 ml    Findings:   LMCA: Normal 0% stenosis. LAD: Normal 0% stenosis. wide patent mid stent area  D1: Mid area 40% stenosis, small vessel. LCx: Normal 0% stenosis. Mid area 30% stenosis     RCA: Normal 0% stenosis. Dominant  Mid area 30-40% stenosis  PDA: free of any disease      Coronary Tree      Dominance: Right          Conclusions:   Patent LAD stent   Minimal disease in LCX and RCA  Recommendations:   Medical treatments      History and Risk Factors    [x] Hypertension     [] Family history of CAD  [x] Hyperlipidemia     [] Cerebrovascular Disease   [x] Prior MI       [] Peripheral Vascular disease   [x] Prior PCI              [] Diabetes Mellitus    [] Left Main PCI. [] Currently on Dialysis. [] Prior CABG. [] Currently smoker. [] Cardiac Arrest outside of healthcare facility. [] Yes    [x] No        Witnessed     [] Yes   [] No     Arrest after arrival of EMS  [] Yes   [] No     [] Cardiac Arrest at other Facility. [] Yes   [x] No    Pre-Procedure Information. Heart Failure       [] Yes    [x] No        Class  [] I      [] II  [] III    [] IV. New Diagnosis    [] Yes  [] No    HF Type      [] Systolic   [] Diastolic          [] Unknown. Diagnostic Test:   EKG       [x] Normal   [] Abnormal    New antiarrhythmia medications:    [] Yes   [] No   New onset atrial fibrillation / Flutter     [] Yes   [] No   ECG Abnormalities:      [] V. Fib   [] Zaida V. Tach           [] NS V. T   [] New LBBB           [] T. Inv  []  ST dev > 0.5 mm         [] PVC's freq  [] PVC's infrequent    Stress Test Performed:      [x] Yes    [] No     Type:     [] Stress Echo   [] Exercise Stress Test (no imaging)      [x] Stress Nuclear  [] Stress Imaging     Results   [] Negative   [] Positive        [x] Indeterminate  [] Unavailable     If Positive/ Risk / Extent of Ischemia:       [x] Low  [] Intermediate         [] High  [] Unavailable      Cardiac CTA Performed:     [] Yes    [x] No      Results   [] CAD   [] Non obstructive CAD      [] No CAD   [] Uncertain      [] Unknown   [] Structural Disease.      Pre Procedure Medications:   [x] Yes    [] No         [x] ASA  [x] Beta Blockers      [] Nitrate  [] Ca Channel Blockers      [] Ranolazine  [x] Statin       [x] Plavix/Others antiplatelets          Lion Echols MD  Fellow, [id number]          I have reviewed the case / procedure with resident / fellow  I have examined the patient personally  Patient agree with treatment plan as discussed before, final arrangement based on my evaluation and exam.    Risk and benefit of procedure planned were explained in details. Procedure was performed by me personally, with all aspect of the procedure being done using standard protocol. Note was modified based on my own assessment and treatment.     Chapito Heart MD  [facility]

## 2021-05-10 NOTE — PROGRESS NOTES
Patient admitted, consent signed, all questions answered. Pt ready for procedure. Bed in low position, call light to reach with side rails up 2 of 2. Right wrist and bilateral groin hair clipped. Wife at bedside with patient.

## 2021-08-24 ENCOUNTER — ANESTHESIA (OUTPATIENT)
Dept: OPERATING ROOM | Age: 61
End: 2021-08-24
Payer: COMMERCIAL

## 2021-08-24 ENCOUNTER — ANESTHESIA EVENT (OUTPATIENT)
Dept: OPERATING ROOM | Age: 61
End: 2021-08-24
Payer: COMMERCIAL

## 2021-08-24 ENCOUNTER — HOSPITAL ENCOUNTER (OUTPATIENT)
Age: 61
Setting detail: OUTPATIENT SURGERY
Discharge: HOME | End: 2021-08-24
Attending: UROLOGY | Admitting: UROLOGY
Payer: COMMERCIAL

## 2021-08-24 VITALS
SYSTOLIC BLOOD PRESSURE: 144 MMHG | WEIGHT: 200 LBS | DIASTOLIC BLOOD PRESSURE: 83 MMHG | HEIGHT: 70 IN | HEART RATE: 61 BPM | RESPIRATION RATE: 16 BRPM | BODY MASS INDEX: 28.63 KG/M2 | OXYGEN SATURATION: 98 % | TEMPERATURE: 98.2 F

## 2021-08-24 VITALS — SYSTOLIC BLOOD PRESSURE: 131 MMHG | DIASTOLIC BLOOD PRESSURE: 85 MMHG | OXYGEN SATURATION: 94 % | TEMPERATURE: 97.7 F

## 2021-08-24 DIAGNOSIS — N40.0 BPH WITHOUT URINARY OBSTRUCTION: Primary | ICD-10-CM

## 2021-08-24 LAB
-: NORMAL
AMORPHOUS: NORMAL
BACTERIA: NORMAL
BILIRUBIN URINE: NEGATIVE
CASTS UA: NORMAL /LPF
COLOR: YELLOW
COMMENT UA: ABNORMAL
CRYSTALS, UA: NORMAL /HPF
EPITHELIAL CELLS UA: NORMAL /HPF (ref 0–5)
GLUCOSE URINE: NEGATIVE
KETONES, URINE: NEGATIVE
LEUKOCYTE ESTERASE, URINE: NEGATIVE
MUCUS: NORMAL
NITRITE, URINE: NEGATIVE
OTHER OBSERVATIONS UA: NORMAL
PH UA: 6 (ref 5–8)
PROTEIN UA: NEGATIVE
RBC UA: NORMAL /HPF (ref 0–2)
RENAL EPITHELIAL, UA: NORMAL /HPF
SPECIFIC GRAVITY UA: 1.01 (ref 1–1.03)
TRICHOMONAS: NORMAL
TURBIDITY: CLEAR
URINE HGB: ABNORMAL
UROBILINOGEN, URINE: NORMAL
WBC UA: NORMAL /HPF (ref 0–5)
YEAST: NORMAL

## 2021-08-24 PROCEDURE — 3700000000 HC ANESTHESIA ATTENDED CARE: Performed by: UROLOGY

## 2021-08-24 PROCEDURE — 7100000001 HC PACU RECOVERY - ADDTL 15 MIN: Performed by: UROLOGY

## 2021-08-24 PROCEDURE — 3600000002 HC SURGERY LEVEL 2 BASE: Performed by: UROLOGY

## 2021-08-24 PROCEDURE — 6360000002 HC RX W HCPCS: Performed by: ANESTHESIOLOGY

## 2021-08-24 PROCEDURE — 7100000000 HC PACU RECOVERY - FIRST 15 MIN: Performed by: UROLOGY

## 2021-08-24 PROCEDURE — 2580000003 HC RX 258: Performed by: ANESTHESIOLOGY

## 2021-08-24 PROCEDURE — 7100000011 HC PHASE II RECOVERY - ADDTL 15 MIN: Performed by: UROLOGY

## 2021-08-24 PROCEDURE — 6370000000 HC RX 637 (ALT 250 FOR IP)

## 2021-08-24 PROCEDURE — 3700000001 HC ADD 15 MINUTES (ANESTHESIA): Performed by: UROLOGY

## 2021-08-24 PROCEDURE — 2709999900 HC NON-CHARGEABLE SUPPLY: Performed by: UROLOGY

## 2021-08-24 PROCEDURE — 6360000002 HC RX W HCPCS: Performed by: NURSE ANESTHETIST, CERTIFIED REGISTERED

## 2021-08-24 PROCEDURE — 3600000012 HC SURGERY LEVEL 2 ADDTL 15MIN: Performed by: UROLOGY

## 2021-08-24 PROCEDURE — 7100000010 HC PHASE II RECOVERY - FIRST 15 MIN: Performed by: UROLOGY

## 2021-08-24 PROCEDURE — 81001 URINALYSIS AUTO W/SCOPE: CPT

## 2021-08-24 PROCEDURE — 2720000010 HC SURG SUPPLY STERILE: Performed by: UROLOGY

## 2021-08-24 PROCEDURE — 2500000003 HC RX 250 WO HCPCS: Performed by: NURSE ANESTHETIST, CERTIFIED REGISTERED

## 2021-08-24 RX ORDER — SODIUM CHLORIDE, SODIUM LACTATE, POTASSIUM CHLORIDE, CALCIUM CHLORIDE 600; 310; 30; 20 MG/100ML; MG/100ML; MG/100ML; MG/100ML
INJECTION, SOLUTION INTRAVENOUS CONTINUOUS
Status: DISCONTINUED | OUTPATIENT
Start: 2021-08-24 | End: 2021-08-24 | Stop reason: HOSPADM

## 2021-08-24 RX ORDER — PROPOFOL 10 MG/ML
INJECTION, EMULSION INTRAVENOUS PRN
Status: DISCONTINUED | OUTPATIENT
Start: 2021-08-24 | End: 2021-08-24 | Stop reason: SDUPTHER

## 2021-08-24 RX ORDER — SODIUM CHLORIDE 0.9 % (FLUSH) 0.9 %
10 SYRINGE (ML) INJECTION EVERY 12 HOURS SCHEDULED
Status: DISCONTINUED | OUTPATIENT
Start: 2021-08-24 | End: 2021-08-24 | Stop reason: HOSPADM

## 2021-08-24 RX ORDER — HYDROCODONE BITARTRATE AND ACETAMINOPHEN 5; 325 MG/1; MG/1
TABLET ORAL
Status: COMPLETED
Start: 2021-08-24 | End: 2021-08-24

## 2021-08-24 RX ORDER — LIDOCAINE HYDROCHLORIDE 20 MG/ML
INJECTION, SOLUTION EPIDURAL; INFILTRATION; INTRACAUDAL; PERINEURAL PRN
Status: DISCONTINUED | OUTPATIENT
Start: 2021-08-24 | End: 2021-08-24 | Stop reason: SDUPTHER

## 2021-08-24 RX ORDER — KETOROLAC TROMETHAMINE 15 MG/ML
15 INJECTION, SOLUTION INTRAMUSCULAR; INTRAVENOUS
Status: DISCONTINUED | OUTPATIENT
Start: 2021-08-24 | End: 2021-08-24 | Stop reason: HOSPADM

## 2021-08-24 RX ORDER — MIDAZOLAM HYDROCHLORIDE 1 MG/ML
INJECTION INTRAMUSCULAR; INTRAVENOUS PRN
Status: DISCONTINUED | OUTPATIENT
Start: 2021-08-24 | End: 2021-08-24 | Stop reason: SDUPTHER

## 2021-08-24 RX ORDER — FENTANYL CITRATE 50 UG/ML
INJECTION, SOLUTION INTRAMUSCULAR; INTRAVENOUS PRN
Status: DISCONTINUED | OUTPATIENT
Start: 2021-08-24 | End: 2021-08-24 | Stop reason: SDUPTHER

## 2021-08-24 RX ORDER — LIDOCAINE HYDROCHLORIDE 10 MG/ML
1 INJECTION, SOLUTION EPIDURAL; INFILTRATION; INTRACAUDAL; PERINEURAL
Status: DISCONTINUED | OUTPATIENT
Start: 2021-08-24 | End: 2021-08-24 | Stop reason: HOSPADM

## 2021-08-24 RX ORDER — FENTANYL CITRATE 50 UG/ML
25 INJECTION, SOLUTION INTRAMUSCULAR; INTRAVENOUS EVERY 5 MIN PRN
Status: COMPLETED | OUTPATIENT
Start: 2021-08-24 | End: 2021-08-24

## 2021-08-24 RX ORDER — SODIUM CHLORIDE 9 MG/ML
INJECTION, SOLUTION INTRAVENOUS CONTINUOUS
Status: DISCONTINUED | OUTPATIENT
Start: 2021-08-24 | End: 2021-08-24

## 2021-08-24 RX ORDER — HYDROCODONE BITARTRATE AND ACETAMINOPHEN 5; 325 MG/1; MG/1
1 TABLET ORAL EVERY 6 HOURS PRN
Qty: 5 TABLET | Refills: 0 | Status: SHIPPED | OUTPATIENT
Start: 2021-08-24 | End: 2021-08-27

## 2021-08-24 RX ORDER — CEPHALEXIN 500 MG/1
500 CAPSULE ORAL 3 TIMES DAILY
Qty: 21 CAPSULE | Refills: 0 | Status: SHIPPED | OUTPATIENT
Start: 2021-08-24 | End: 2022-01-17

## 2021-08-24 RX ORDER — HYDROCODONE BITARTRATE AND ACETAMINOPHEN 5; 325 MG/1; MG/1
1 TABLET ORAL
Status: COMPLETED | OUTPATIENT
Start: 2021-08-24 | End: 2021-08-24

## 2021-08-24 RX ORDER — DEXAMETHASONE SODIUM PHOSPHATE 10 MG/ML
INJECTION, SOLUTION INTRAMUSCULAR; INTRAVENOUS PRN
Status: DISCONTINUED | OUTPATIENT
Start: 2021-08-24 | End: 2021-08-24 | Stop reason: SDUPTHER

## 2021-08-24 RX ORDER — SODIUM CHLORIDE 9 MG/ML
25 INJECTION, SOLUTION INTRAVENOUS PRN
Status: DISCONTINUED | OUTPATIENT
Start: 2021-08-24 | End: 2021-08-24 | Stop reason: HOSPADM

## 2021-08-24 RX ORDER — KETOROLAC TROMETHAMINE 15 MG/ML
INJECTION, SOLUTION INTRAMUSCULAR; INTRAVENOUS
Status: DISCONTINUED
Start: 2021-08-24 | End: 2021-08-24 | Stop reason: HOSPADM

## 2021-08-24 RX ORDER — SODIUM CHLORIDE 0.9 % (FLUSH) 0.9 %
10 SYRINGE (ML) INJECTION PRN
Status: DISCONTINUED | OUTPATIENT
Start: 2021-08-24 | End: 2021-08-24 | Stop reason: HOSPADM

## 2021-08-24 RX ORDER — ATORVASTATIN CALCIUM 40 MG/1
80 TABLET, FILM COATED ORAL NIGHTLY
COMMUNITY
Start: 2021-06-04

## 2021-08-24 RX ORDER — ONDANSETRON 2 MG/ML
INJECTION INTRAMUSCULAR; INTRAVENOUS PRN
Status: DISCONTINUED | OUTPATIENT
Start: 2021-08-24 | End: 2021-08-24 | Stop reason: SDUPTHER

## 2021-08-24 RX ORDER — ONDANSETRON 2 MG/ML
4 INJECTION INTRAMUSCULAR; INTRAVENOUS
Status: DISCONTINUED | OUTPATIENT
Start: 2021-08-24 | End: 2021-08-24 | Stop reason: HOSPADM

## 2021-08-24 RX ORDER — CEFAZOLIN SODIUM 1 G/3ML
INJECTION, POWDER, FOR SOLUTION INTRAMUSCULAR; INTRAVENOUS PRN
Status: DISCONTINUED | OUTPATIENT
Start: 2021-08-24 | End: 2021-08-24 | Stop reason: SDUPTHER

## 2021-08-24 RX ORDER — HYDROMORPHONE HYDROCHLORIDE 1 MG/ML
0.25 INJECTION, SOLUTION INTRAMUSCULAR; INTRAVENOUS; SUBCUTANEOUS EVERY 5 MIN PRN
Status: COMPLETED | OUTPATIENT
Start: 2021-08-24 | End: 2021-08-24

## 2021-08-24 RX ADMIN — Medication 50 MCG: at 08:26

## 2021-08-24 RX ADMIN — ONDANSETRON 4 MG: 2 INJECTION, SOLUTION INTRAMUSCULAR; INTRAVENOUS at 08:31

## 2021-08-24 RX ADMIN — PROPOFOL 200 MG: 10 INJECTION, EMULSION INTRAVENOUS at 08:26

## 2021-08-24 RX ADMIN — FENTANYL CITRATE 25 MCG: 50 INJECTION, SOLUTION INTRAMUSCULAR; INTRAVENOUS at 10:15

## 2021-08-24 RX ADMIN — HYDROMORPHONE HYDROCHLORIDE 0.25 MG: 1 INJECTION, SOLUTION INTRAMUSCULAR; INTRAVENOUS; SUBCUTANEOUS at 09:44

## 2021-08-24 RX ADMIN — FENTANYL CITRATE 25 MCG: 50 INJECTION, SOLUTION INTRAMUSCULAR; INTRAVENOUS at 10:20

## 2021-08-24 RX ADMIN — Medication 25 MCG: at 08:50

## 2021-08-24 RX ADMIN — HYDROCODONE BITARTRATE AND ACETAMINOPHEN 1 TABLET: 5; 325 TABLET ORAL at 10:46

## 2021-08-24 RX ADMIN — SODIUM CHLORIDE, POTASSIUM CHLORIDE, SODIUM LACTATE AND CALCIUM CHLORIDE: 600; 310; 30; 20 INJECTION, SOLUTION INTRAVENOUS at 07:09

## 2021-08-24 RX ADMIN — HYDROMORPHONE HYDROCHLORIDE 0.25 MG: 1 INJECTION, SOLUTION INTRAMUSCULAR; INTRAVENOUS; SUBCUTANEOUS at 09:50

## 2021-08-24 RX ADMIN — HYDROMORPHONE HYDROCHLORIDE 0.25 MG: 1 INJECTION, SOLUTION INTRAMUSCULAR; INTRAVENOUS; SUBCUTANEOUS at 10:00

## 2021-08-24 RX ADMIN — Medication 25 MCG: at 08:56

## 2021-08-24 RX ADMIN — FENTANYL CITRATE 25 MCG: 50 INJECTION, SOLUTION INTRAMUSCULAR; INTRAVENOUS at 10:10

## 2021-08-24 RX ADMIN — DEXAMETHASONE SODIUM PHOSPHATE 10 MG: 10 INJECTION INTRAMUSCULAR; INTRAVENOUS at 08:31

## 2021-08-24 RX ADMIN — CEFAZOLIN 2000 MG: 1 INJECTION, POWDER, FOR SOLUTION INTRAMUSCULAR; INTRAVENOUS at 08:31

## 2021-08-24 RX ADMIN — HYDROMORPHONE HYDROCHLORIDE 0.25 MG: 1 INJECTION, SOLUTION INTRAMUSCULAR; INTRAVENOUS; SUBCUTANEOUS at 09:55

## 2021-08-24 RX ADMIN — FENTANYL CITRATE 25 MCG: 50 INJECTION, SOLUTION INTRAMUSCULAR; INTRAVENOUS at 10:05

## 2021-08-24 RX ADMIN — LIDOCAINE HYDROCHLORIDE 100 MG: 20 INJECTION, SOLUTION EPIDURAL; INFILTRATION; INTRACAUDAL; PERINEURAL at 08:26

## 2021-08-24 RX ADMIN — MIDAZOLAM 2 MG: 1 INJECTION INTRAMUSCULAR; INTRAVENOUS at 08:25

## 2021-08-24 ASSESSMENT — PAIN DESCRIPTION - DESCRIPTORS
DESCRIPTORS: DISCOMFORT;PRESSURE
DESCRIPTORS: PRESSURE

## 2021-08-24 ASSESSMENT — PULMONARY FUNCTION TESTS
PIF_VALUE: 8
PIF_VALUE: 2
PIF_VALUE: 9
PIF_VALUE: 8
PIF_VALUE: 8
PIF_VALUE: 1
PIF_VALUE: 10
PIF_VALUE: 8
PIF_VALUE: 8
PIF_VALUE: 1
PIF_VALUE: 10
PIF_VALUE: 11
PIF_VALUE: 10
PIF_VALUE: 4
PIF_VALUE: 1
PIF_VALUE: 4
PIF_VALUE: 0
PIF_VALUE: 8
PIF_VALUE: 7
PIF_VALUE: 16
PIF_VALUE: 10
PIF_VALUE: 10
PIF_VALUE: 8
PIF_VALUE: 1
PIF_VALUE: 9
PIF_VALUE: 5
PIF_VALUE: 9
PIF_VALUE: 9
PIF_VALUE: 8
PIF_VALUE: 9
PIF_VALUE: 7
PIF_VALUE: 8
PIF_VALUE: 10
PIF_VALUE: 9
PIF_VALUE: 7
PIF_VALUE: 8
PIF_VALUE: 9
PIF_VALUE: 10
PIF_VALUE: 10
PIF_VALUE: 16
PIF_VALUE: 8
PIF_VALUE: 10
PIF_VALUE: 8
PIF_VALUE: 1
PIF_VALUE: 5
PIF_VALUE: 10
PIF_VALUE: 5
PIF_VALUE: 1

## 2021-08-24 ASSESSMENT — PAIN SCALES - GENERAL
PAINLEVEL_OUTOF10: 6
PAINLEVEL_OUTOF10: 6
PAINLEVEL_OUTOF10: 5
PAINLEVEL_OUTOF10: 9
PAINLEVEL_OUTOF10: 5
PAINLEVEL_OUTOF10: 5
PAINLEVEL_OUTOF10: 9
PAINLEVEL_OUTOF10: 6
PAINLEVEL_OUTOF10: 5
PAINLEVEL_OUTOF10: 9
PAINLEVEL_OUTOF10: 9
PAINLEVEL_OUTOF10: 6
PAINLEVEL_OUTOF10: 0
PAINLEVEL_OUTOF10: 5
PAINLEVEL_OUTOF10: 6
PAINLEVEL_OUTOF10: 5
PAINLEVEL_OUTOF10: 6

## 2021-08-24 ASSESSMENT — PAIN DESCRIPTION - LOCATION
LOCATION: ABDOMEN
LOCATION: ABDOMEN

## 2021-08-24 ASSESSMENT — PAIN DESCRIPTION - ORIENTATION
ORIENTATION: LOWER
ORIENTATION: LOWER

## 2021-08-24 ASSESSMENT — PAIN - FUNCTIONAL ASSESSMENT: PAIN_FUNCTIONAL_ASSESSMENT: 0-10

## 2021-08-24 NOTE — OP NOTE
[id number] [facility] [address]                [address] [place] [address]                                OPERATIVE REPORT    PATIENT NAME: Lucy Botello                      :        1960  MED REC NO:   [id number]                             ROOM:  ACCOUNT NO:   [de-identified]                           ADMIT DATE: 2021  PROVIDER:     Niranjan Screen    DATE OF PROCEDURE:  2021    PREOPERATIVE DIAGNOSIS:  BPH, bladder outlet obstruction. POSTOPERATIVE DIAGNOSIS:  BPH, bladder outlet obstruction. OPERATION PERFORMED:  Cystoscopy, GreenLight laser ablation on the  prostate. SURGEON:  Niranjan Jones MD    ASSISTANT:  None. ANESTHESIA:  General.    COMPLICATIONS:  None. ESTIMATED BLOOD LOSS:  Minimal.    NARRATIVE OF PROCEDURE:  This is a 26-year-old white male who has a  history of BPH and bladder outlet obstruction refractory to medical  management. He was given option for treatment. He opted to have the  above procedure performed. All the risks and benefits were explained to  the patient and informed consent was obtained. He was taken back to the  operating room. This 26-year-old white male was brought back to the operating room. Preoperative antibiotics were given to the patient. He was sterilely  prepped and draped in standard fashion after he was placed in modified  dorsal lithotomy position. This 26-year-old white male was sterilely prepped and draped in standard  fashion. A 23-Occitan rigid cystoscope with a visual obturator was  inserted into the urethra and advanced into his bladder. Bladder was  examined. No tumors or stones were noted. Both ureteral orifices were  normal.  I then advanced the laser fiber and I began the ablation at the  bladder neck and this progressed towards the verumontanum. I made sure  not to over ablate the bladder neck and I made sure not to ablate distal  to the verumontanum.   Once a nice adequate channel was made, I made sure  there was adequate hemostasis. I made sure there were no injuries in  the bladder, then I removed the scope and I placed a 20-Khmer 3-way  Trivedi catheter. Bladder irrigation was started. There was clear urine  returned and that was the end of the procedure. The patient will be  discharged home. He will follow up with me tomorrow for voiding trial  in the office. I was present and scrubbed throughout the entire  procedure.         Darien Rushing    D: 08/24/2021 9:44:18       T: 08/24/2021 15:27:48     [id number]  Job#: [id number]     Doc#: [id number]    CC:  Beryl Acosta

## 2021-08-24 NOTE — ANESTHESIA PRE PROCEDURE
Department of Anesthesiology  Preprocedure Note       Name:  Rhonda Her   Age:  62 y.o.  :  1960                                          MRN:  [id number]         Date:  2021      Surgeon: Karen Kelly):  Nona Bergeron MD    Procedure: Procedure(s):  CYSTOSCOPY GREEN LIGHT LASER OF PROSTATE - FORTEC    Medications prior to admission:   Prior to Admission medications    Medication Sig Start Date End Date Taking? Authorizing Provider   allopurinol (ZYLOPRIM) 100 MG tablet TAKE TWO TABLETS BY MOUTH EVERY DAY 7/15/21   Naye Aguiar MD   tamsulosin (FLOMAX) 0.4 MG capsule TAKE TWO CAPSULES BY MOUTH EVERY DAY 21   Naye Aguiar MD   ticagrelor (BRILINTA) 60 MG TABS tablet Take 1 tablet by mouth 2 times daily 19   Naye Aguiar MD   lisinopril (PRINIVIL;ZESTRIL) 2.5 MG tablet Take 1.25 mg by mouth daily    Historical Provider, MD   carvedilol (COREG) 3.125 MG tablet Take 3.125 mg by mouth 2 times daily (with meals)    Historical Provider, MD   Cholecalciferol (VITAMIN D3) 5000 units TABS Take by mouth    Historical Provider, MD   vitamin C (ASCORBIC ACID) 500 MG tablet Take 500 mg by mouth daily    Historical Provider, MD       Current medications:    Current Facility-Administered Medications   Medication Dose Route Frequency Provider Last Rate Last Admin    0.9 % sodium chloride infusion   Intravenous Continuous Ndal Jd Castellano MD        lactated ringers infusion   Intravenous Continuous Mayur Neumann MD        sodium chloride flush 0.9 % injection 10 mL  10 mL Intravenous 2 times per day Mayur Neumann MD        sodium chloride flush 0.9 % injection 10 mL  10 mL Intravenous PRN Mayur Neumann MD        0.9 % sodium chloride infusion  25 mL Intravenous PRN Mayur Neumann MD        lidocaine PF 1 % injection 1 mL  1 mL Intradermal Once PRN Mayur Neumann MD           Allergies:     Allergies   Allergen Reactions    Morphine Rash    Morphine Rash       Problem List:    Patient Active Problem List   Diagnosis Code    Essential hypertension I10    History of heart attack I25.2    Mixed hyperlipidemia E78.2    Idiopathic gout M10.00    BPH without urinary obstruction N40.0    Vitamin D deficiency E55.9       Past Medical History:        Diagnosis Date    CAD (coronary artery disease)     5/10/2021 cath non obstructive cad medical tx Dr. Kwabena Arit[facility])     basal cell just inside right nares    Enlarged prostate 2016    On Flomax    Family history of heart attack     Mother, Father, Sisters    Gout     Heart attack ([address] 02/2018    High triglycerides     History of heart attack 02/2018    Hyperlipidemia     Hypertension     Sleep apnea     suppose to wear cpap but does not use it, wears an oral appliance  Eliezer Duffy MD 1/5/2021       Past Surgical History:        Procedure Laterality Date    CARDIAC CATHETERIZATION  05/10/2021    Minimal disease to LCX and RCA medical tx    [facility]  [facility]    [facility]    INGUINAL HERNIA REPAIR Bilateral     KNEE ARTHROSCOPY Right 2012    miniscus    KNEE CARTILAGE SURGERY Left     SEPTOPLASTY Bilateral 01/16/2017    SHOULDER ARTHROSCOPY Left 1995 ?  SHOULDER SURGERY Left     SKIN BIOPSY Right     just inside right nares    TURBINATE RESECTION Bilateral 01/16/2017       Social History:    Social History     Tobacco Use    Smoking status: Never Smoker    Smokeless tobacco: Never Used   Substance Use Topics    Alcohol use: Yes     Comment: 1 time per week                                Counseling given: Not Answered      Vital Signs (Current): There were no vitals filed for this visit.                                            BP Readings from Last 3 Encounters:   08/13/21 118/76   05/10/21 109/77   04/23/21 120/72       NPO Status:                                                                                 BMI:   Wt Readings from Last 3 Encounters: 08/13/21 198 lb (89.8 kg)   05/10/21 194 lb (88 kg)   04/23/21 196 lb (88.9 kg)     There is no height or weight on file to calculate BMI.    CBC:   Lab Results   Component Value Date    WBC 7.0 02/09/2021    RBC 5.68 02/09/2021    HGB 16.5 02/09/2021    HCT 52.3 02/09/2021    MCV 92.1 02/09/2021    RDW 12.2 02/09/2021     05/10/2021       CMP:   Lab Results   Component Value Date     02/09/2021    K 4.4 02/09/2021     02/09/2021    CO2 23 02/09/2021    BUN 13 02/09/2021    CREATININE 0.87 05/10/2021    CREATININE 0.70 02/09/2021    CREATININE 0.8 06/01/2018    GFRAA >60 02/09/2021    LABGLOM >60 05/10/2021    PROT 8.1 02/09/2021    CALCIUM 9.5 02/09/2021    BILITOT 0.93 02/09/2021    ALKPHOS 104 02/09/2021    AST 23 02/09/2021    ALT 22 02/09/2021       POC Tests: No results for input(s): POCGLU, POCNA, POCK, POCCL, POCBUN, POCHEMO, POCHCT in the last 72 hours.     Coags: No results found for: PROTIME, INR, APTT    HCG (If Applicable): No results found for: PREGTESTUR, PREGSERUM, HCG, HCGQUANT     ABGs: No results found for: PHART, PO2ART, BGV6QIO, ZDA3VBP, BEART, J3NEHFKI     Type & Screen (If Applicable):  No results found for: LABABO, LABRH    Drug/Infectious Status (If Applicable):  No results found for: HIV, HEPCAB    COVID-19 Screening (If Applicable):   Lab Results   Component Value Date    COVID19 Not Detected 10/29/2020           Anesthesia Evaluation   no history of anesthetic complications:   Airway: Mallampati: II  TM distance: >3 FB   Neck ROM: full  Mouth opening: > = 3 FB Dental:          Pulmonary:normal exam    (+) sleep apnea:                             Cardiovascular:  Exercise tolerance: good (>4 METS),   (+) hypertension:, past MI:, CAD:, CABG/stent:, hyperlipidemia    (-)  angina       Beta Blocker:  Dose within 24 Hrs      ROS comment: 5/21  Diagnostic procedure: Coronary angiography     Complications:    - No complication     Indications:    - Abnormal nuclear perfusion test       - Atypical angina       - Coronary risk factors       - Previous stent placement      Conclusions      Procedure Summary      Patent LAD stent   Minimal disease in LCX and RCA      Recommendations      Medical treatments     Neuro/Psych:   Negative Neuro/Psych ROS              GI/Hepatic/Renal:        (-) GERD (very rare)       Endo/Other:    (+) malignancy/cancer (skin). Abdominal:             Vascular: Other Findings:             Anesthesia Plan      general     ASA 3     (Lma)  Induction: intravenous. MIPS: Postoperative opioids intended and Prophylactic antiemetics administered. Anesthetic plan and risks discussed with patient. Plan discussed with CRNA.     Attending anesthesiologist reviewed and agrees with Arina Gibbons MD   8/24/2021

## 2021-08-24 NOTE — BRIEF OP NOTE
Brief Postoperative Note      Patient: Garrett Banegas  YOB: 1960  MRN: [id number]    Date of Procedure: 8/24/2021    Pre-Op Diagnosis: DX BPH    Post-Op Diagnosis: Same       Procedure(s):  CYSTOSCOPY GREEN LIGHT LASER OF PROSTATE - 101 Dates     Surgeon(s):  Lola Carrasco MD    Assistant:  * No surgical staff found *    Anesthesia: General    Estimated Blood Loss (mL): Minimal    Complications: None    Specimens:   * No specimens in log *    Implants:  * No implants in log *      Drains: * No LDAs found *    Findings: d    Electronically signed by Lola Carrasco MD on 8/24/2021 at 8:28 AM Rifampin Pregnancy And Lactation Text: This medication is Pregnancy Category C and it isn't know if it is safe during pregnancy. It is also excreted in breast milk and should not be used if you are breast feeding.

## 2021-08-24 NOTE — ANESTHESIA POSTPROCEDURE EVALUATION
Department of Anesthesiology  Postprocedure Note    Patient: Aries Barajas  MRN: [id number]  [place]: 1960  Date of evaluation: 8/24/2021  Time:  2:48 PM     Procedure Summary     Date: 08/24/21 Room / Location: [facility] / [facility] - INPATIENT    Anesthesia Start: 0820 Anesthesia Stop: 0920    Procedure: CYSTOSCOPY GREEN LIGHT LASER OF PROSTATE - [address] (N/A Urethra) Diagnosis: (DX BPH)    Surgeons: John Arciniega MD Responsible Provider: Eduardo Elizondo MD    Anesthesia Type: general ASA Status: 3          Anesthesia Type: general    Brigid Phase I: Brigid Score: 10    Brigid Phase II: Brigid Score: 10    Last vitals: Reviewed and per EMR flowsheets.        Anesthesia Post Evaluation    Patient location during evaluation: PACU  Patient participation: complete - patient participated  Level of consciousness: awake  Airway patency: patent  Nausea & Vomiting: no nausea  Complications: no  Cardiovascular status: blood pressure returned to baseline  Respiratory status: acceptable  Hydration status: euvolemic

## 2021-08-24 NOTE — H&P
C (ASCORBIC ACID) 500 MG tablet Take 500 mg by mouth daily   Yes Historical Provider, MD   ticagrelor (BRILINTA) 60 MG TABS tablet Take 1 tablet by mouth 2 times daily 19   Akilah Mondragon MD         This is a 62 y.o. male who is pleasant, cooperative, alert and oriented x3, in no acute distress    Physical Exam:  Lungs: Bilateral equal air entry, unlabored, clear to ausculation, no wheezing, rales or rhonchi, normal effort No CVA tenderness   Cardiovascular: HR 57 asymptomatic bradycardic rate and regular rhythm, no murmur, gallop, rub. Abdomen: Soft, nontender, nondistended, normal bowel sounds. Labs:  No results for input(s): HGB, HCT, WBC, MCV, PLT, NA, K, CL, CO2, BUN, CREATININE, GLUCOSE, INR, PROTIME, APTT, AST, ALT, LABALBU, HCG in the last 720 hours. No results for input(s): COVID19 in the last 720 hours. GARRETT Lakhani CNP   Electronically signed 2021 at 7:24 AM      Akilah Mondragon MD   Physician   Specialty:  Family Medicine   Progress Notes      Signed   Encounter Date:  2021         Related encounter: Office Visit from 2021 in [address] [place] [address] All                Name: Guillermina Gallegos  : 1960     COVID-19 precautions were taken throughout the entire office visit. Patient was screened for COVID-19 symptoms. A mask was offered to be worn by the patient and office staff/doctor throughout the entire office visit and distancing maintained as much as possible in between physical examination periods. Chief Complaint:          Chief Complaint   Patient presents with    Gout    Hypertension    Hyperlipidemia         History of Present Illness:      Guillermina Gallegos is a 62 y.o.  male who presents with Gout, Hypertension, and Hyperlipidemia        Cb was alone at today's visit. Since your last visit here have you seen any other doctors?  yes - Dr. Lukas Sargent, Cardiologist  Dr. Genna Judge, Urology  Since your last visit here have you had any medication changes (started new or stopped old meds)? no  Since your last visit here have you had any surgical procedures (examples: heart cath, colonoscopy, general surgery)? No, but states is sx for the Green light procedure 8/24/21 w/Dr. 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 The patient reports no acute gout attacks since last clinic visit. Attacks occur primarily in the left first MTP joint. Patient reports his chronic pain is resolved, his joint stiffness is resolved and his joint swelling is resolved. Limitation on activities include none. The patient is avoiding high purine foods and reports consuming 0 alcoholic drinks per day. Hypertension: Vivek Schultz presents with known history of hypertension and is here for followup. Is Cb using medication(s) as prescribed? yes. Does Vivek Schultz have any questions related to the  treatment of this medical condition? no     We reviewed that last two BP readings:        BP Readings from Last 2 Encounters:   08/13/21 118/76   05/10/21 109/77      Hyperlipidemia:  No new myalgias or GI upset on atorvastatin (Lipitor). Medication compliance: compliant all of the time. Patient is  following a low fat, low cholesterol diet. He is  exercising regularly.               Lab Results   Component Value Date     CHOL 116 08/13/2021     TRIG 130 08/13/2021     HDL 26 (A) 08/13/2021     LDLCALC 63 08/13/2021              Lab Results   Component Value Date     ALT 22 02/09/2021     AST 23 02/09/2021             The following recent tests were reviewed:         Results for orders placed or performed in visit on 08/13/21   Lipid Panel   Result Value Ref Range     Cholesterol, Total 116 mg/dL     HDL 26 (A) 35 - 70 mg/dL     LDL Calculated 63 0 - 160 mg/dL     Triglycerides 130 mg/dL     Chol/HDL Ratio 4.4       VLDL         Cholesterol non HDL 89           Past Medical History: Past Medical History        Past Medical History:   Diagnosis Date    CAD (coronary artery disease)       5/10/2021 cath non obstructive cad medical tx Dr. Nayely Jacome [facility])       basal cell just inside right nares    Enlarged prostate 2016     On Flomax    Family history of heart attack       Mother, Father, Sisters    Gout      Heart attack ([address] 02/2018    High triglycerides      History of heart attack 02/2018    Hyperlipidemia      Hypertension      Sleep apnea       suppose to wear cpap but does not use it, wears an oral appliance  Liana Hogue MD 1/5/2021         Reviewed allhealth maintenance requirements and ordered appropriate tests     Past Surgical History:      Past Surgical History         Past Surgical History:   Procedure Laterality Date    CARDIAC CATHETERIZATION   05/10/2021     Minimal disease to LCX and RCA medical tx    CORONARY ANGIOPLASTY   2018     x2 to LAD in [address] Bilateral '85, '93    INGUINAL HERNIA REPAIR Bilateral      KNEE ARTHROSCOPY Right 2012     miniscus    KNEE CARTILAGE SURGERY Left      SEPTOPLASTY Bilateral 01/16/2017    SHOULDER ARTHROSCOPY Left 1995 ?     SHOULDER SURGERY Left      SKIN BIOPSY Right       just inside right nares    TURBINATE RESECTION Bilateral 01/16/2017            Health Maintenance:           Health Maintenance   Topic Date Due    DTaP/Tdap/Td vaccine (2 - Td or Tdap) 05/26/2021    Flu vaccine (1) 09/01/2021    Potassium monitoring  05/10/2022    Creatinine monitoring  05/10/2022    Colon cancer screen colonoscopy  02/08/2023    Diabetes screen  02/09/2024    Lipid screen  08/13/2026    Shingles Vaccine  Completed    COVID-19 Vaccine  Completed    Hepatitis A vaccine  Aged Out    Hepatitis B vaccine  Aged Out    Hib vaccine  Aged Out    Meningococcal (ACWY) vaccine  Aged Out    Pneumococcal 0-64 years Vaccine  Aged Out    Hepatitis C screen  Discontinued    HIV screen Year: Never true   Transportation Needs:     Lack of Transportation (Medical):  Lack of Transportation (Non-Medical):    Physical Activity:     Days of Exercise per Week:     Minutes of Exercise per Session:    Stress:     Feeling of Stress :    Social Connections:     Frequency of Communication with Friends and Family:     Frequency of Social Gatherings with Friends and Family:     Attends [employer]ism Services:     Active Member of Clubs or Organizations:     Attends Club or Organization Meetings:     Marital Status:    Intimate Partner Violence:     Fear of Current or Ex-Partner:     Emotionally Abused:     Physically Abused:     Sexually Abused:                Family History:      Family History         Family History   Problem Relation Age of Onset    Heart Disease Mother      Cancer Mother           bladder    Heart Attack Mother      Other Mother           AAA    Heart Disease Father      Cancer Father      Heart Attack Father      Stroke Father      Heart Disease Sister      Stroke Sister      Prostate Cancer Brother      Heart Disease Brother      Heart Disease Sister      Heart Attack Sister      Uterine Cancer Sister      Prostate Cancer Brother      No Known Problems Maternal Grandmother      No Known Problems Maternal Grandfather      No Known Problems Paternal Grandmother      No Known Problems Paternal Grandfather      Heart Attack Sister      Uterine Cancer Sister      Other Maternal Uncle           AAA            Review of Systems:   Review of Systems   Respiratory: Negative for chest tightness and shortness of breath. Cardiovascular: Negative for chest pain, palpitations and leg swelling. Musculoskeletal: Negative for arthralgias. Physical Exam:      Vitals:  Blood pressure 118/76, height 5' 10\" (1.778 m), weight 198 lb (89.8 kg). Body mass index is 28.41 kg/m². Physical Exam  Vitals and nursing note reviewed.    Constitutional: Appearance: Normal appearance. Cardiovascular:      Rate and Rhythm: Normal rate. Pulses: Normal pulses. Pulmonary:      Effort: Pulmonary effort is normal.   Musculoskeletal:         General: No swelling or tenderness. Neurological:      General: No focal deficit present. Mental Status: He is alert. Assesment:        Diagnosis Orders   1. Mixed hyperlipidemia  Lipid Panel     Lipid Panel   2. Idiopathic gout, unspecified chronicity, unspecified site      3. Essential hypertension               Plan:      1. Mixed hyperlipidemia  Labs reveiwed today and noted low HDL he is working on [employer] and will continue with statin no refill needed  - Lipid Panel; Future  - Lipid Panel     2. Idiopathic gout, unspecified chronicity, unspecified site  Lab reveiewd today and doing well continue allopurinol. 3. Essential hypertension  BP much imporved and will continue with medication at same dose. Any history or documentation done by my MAs was independently reviewed and confirmed by myself. I reviewed the above assessment and plan withCb Nicholson. Questions were answered and it appears that the patient has a good understanding of the visit today. The patient was advised that failure to complete any requested testing in a timely fashion could result in delayed diagnosis and/or treatment. Thus, any decision to defer recommended testing is done so at [employer] own risk. I would like to see Garrett Reef back in  Return in about 6 months (around 2/13/2022) for CPE. or sooner if any new issues occur or if any acute or chronic medical conditions worsen. Additionally, or if the patent has any concerns. Electronically signed by Jagdish Kendrick MD on 8/21/2021 at 5:58 AM   [address] [address] [address]  Phone: [phone] Fax: [phone]     *Details of today's visit, including Impression/Concerns and Plan, are available (via Epic) for all other treating physicians/providers for discussion and management purposes.                                        Revision History

## 2021-10-04 ENCOUNTER — HOSPITAL ENCOUNTER (OUTPATIENT)
Age: 61
Setting detail: SPECIMEN
Discharge: HOME | End: 2021-10-04
Payer: COMMERCIAL

## 2021-10-05 LAB
CULTURE: NO GROWTH
Lab: NORMAL
SPECIMEN DESCRIPTION: NORMAL

## 2022-01-06 ENCOUNTER — HOSPITAL ENCOUNTER (OUTPATIENT)
Facility: CLINIC | Age: 62
Discharge: HOME | End: 2022-01-06
Payer: COMMERCIAL

## 2022-01-06 PROCEDURE — G0103 PSA SCREENING: HCPCS

## 2022-01-06 PROCEDURE — 36415 COLL VENOUS BLD VENIPUNCTURE: CPT

## 2022-01-07 LAB — PROSTATE SPECIFIC ANTIGEN: 0.38 UG/L

## 2022-01-18 NOTE — H&P
Killian Jaime, Anuja Powers, Adolfo Castelan, & Jamil   Urology H&P      Patient:  Shayla Alanis  MRN: [id number]  YOB: 1960    CHIEF COMPLAINT: Bladder neck contracture    HISTORY OF PRESENT ILLNESS:   The patient is a 62 y.o. male who presents with bladder neck contracture. Patient with history of greenlight laser ablation of the prostate on 8/24/2021. Has been having LUTS and presents today for bladder neck contracture incision. Patient's old records, notes and chart reviewed and summarized above. Past Medical History:    Past Medical History:   Diagnosis Date    CAD (coronary artery disease)     5/10/2021 cath Dr. Carline fuller/ last seen 8-2021    Cancer [address]     basal cell just inside right nares    COVID-19 12/29/2021    dry cough, sneezing, headache    Enlarged prostate 2016    Family history of heart attack     Mother, Father, Sisters    Gout     Heart attack ([facility] [address] 02/2018    High triglycerides     Hyperlipidemia     Hypertension     Sleep apnea       Abraham Durant MD last seen 1/5/2021/[place]- Cpap machine    Wears glasses     Wellness examination     PCP Brayan Strong MD/ [facility]/ last seen 8-2021       Past Surgical History:    Past Surgical History:   Procedure Laterality Date    CARDIAC CATHETERIZATION  05/10/2021    Minimal disease to LCX and RCA medical tx    CORONARY ANGIOPLASTY  2018    Stents x2 to LAD in [facility] Bilateral '85, '93    INGUINAL HERNIA REPAIR Bilateral     KNEE ARTHROSCOPY Right 2012    miniscus    KNEE CARTILAGE SURGERY Left     SEPTOPLASTY Bilateral 01/16/2017    SHOULDER ARTHROSCOPY Left 1995 ?     SKIN BIOPSY Right     just inside right nares    TURBINATE RESECTION Bilateral 01/16/2017    TURP N/A 8/24/2021    CYSTOSCOPY GREEN LIGHT LASER OF PROSTATE - FORTEC performed by Ronda Person MD at [facility]       Medications:    No current facility-administered medications for this encounter. Current Outpatient Medications:     ticagrelor (BRILINTA) 60 MG TABS tablet, Take 60 mg by mouth 2 times daily Mgmt. Dr. Breana Balderrama held 1-15-22 for OR 1-19-22, Disp: , Rfl:     allopurinol (ZYLOPRIM) 100 MG tablet, TAKE TWO TABLETS BY MOUTH EVERY DAY, Disp: 180 tablet, Rfl: 0    atorvastatin (LIPITOR) 40 MG tablet, Take 40 mg by mouth nightly , Disp: , Rfl:     lisinopril (PRINIVIL;ZESTRIL) 2.5 MG tablet, Take 1.25 mg by mouth nightly , Disp: , Rfl:     carvedilol (COREG) 3.125 MG tablet, Take 3.125 mg by mouth 2 times daily (with meals), Disp: , Rfl:     Cholecalciferol (VITAMIN D3) 5000 units TABS, Take 5,000 Units by mouth daily , Disp: , Rfl:     vitamin C (ASCORBIC ACID) 500 MG tablet, Take 500 mg by mouth daily, Disp: , Rfl:     Allergies: Allergies   Allergen Reactions    Morphine Rash       Social History:   Social History     Socioeconomic History    Marital status:      Spouse name: Itz Coleman Number of children: 2    Years of education: Not on file    Highest education level: Not on file   Occupational History    Occupation:    Tobacco Use    Smoking status: Never Smoker    Smokeless tobacco: Never Used   Vaping Use    Vaping Use: Never used   Substance and Sexual Activity    Alcohol use: Yes     Comment: 2 X weekly 2-3 drinks    Drug use: Never    Sexual activity: Yes     Partners: Female   Other Topics Concern    Not on file   Social History Narrative    ** Merged History Encounter **          Social Determinants of Health     Financial Resource Strain: Low Risk     Difficulty of Paying Living Expenses: Not hard at all   Food Insecurity: No Food Insecurity    Worried About [address] in the Last Year: Never true    [address] [facility] [address] in the Last Year: Never true   Transportation Needs:     Lack of Transportation (Medical): Not on file    Lack of Transportation (Non-Medical):  Not on file   Physical Activity:     Days of Exercise per Week: Not on file    Minutes of Exercise per Session: Not on file   Stress:     Feeling of Stress : Not on file   Social Connections:     Frequency of Communication with Friends and Family: Not on file    Frequency of Social Gatherings with Friends and Family: Not on file    Attends [employer] Services: Not on file    Active Member of Clubs or Organizations: Not on file    Attends Club or Organization Meetings: Not on file    Marital Status: Not on file   Intimate Partner Violence:     Fear of Current or Ex-Partner: Not on file    Emotionally Abused: Not on file    Physically Abused: Not on file    Sexually Abused: Not on file   Housing Stability:     Unable to Pay for Housing in the Last Year: Not on file    Number of [place] in the Last Year: Not on file    Unstable Housing in the Last Year: Not on file       Family History:    Family History   Problem Relation Age of Onset    Heart Disease Mother     Cancer Mother         bladder    Heart Attack Mother     Other Mother         AAA    High Blood Pressure Mother     Heart Disease Father     Cancer Father     Heart Attack Father     Stroke Father     High Blood Pressure Father     Heart Disease Sister     Stroke Sister     Prostate Cancer Brother     Heart Disease Brother     Heart Disease Sister     Heart Attack Sister     Uterine Cancer Sister     Prostate Cancer Brother     No Known Problems Maternal Grandmother     No Known Problems Maternal Grandfather     No Known Problems Paternal Grandmother     No Known Problems Paternal Grandfather     Heart Attack Sister     Uterine Cancer Sister     Other Maternal Uncle         AAA       REVIEW OF SYSTEMS:  A comprehensive 14 point review of systems was obtained. Constitutional: No fatigue  Eyes: No blurry vision  Ears, nose, mouth, throat, face: No ringing in the ears; no facial droop. Respiratory: No cough or cold.   Cardiovascular: No palpitations  Gastrointestinal: No diarrhea or constipation. Genitourinary: No burning with urination  Integument/Skin: No rashes  Hematologic/Lymphatic: No easy bruising  Musculoskeletal: No muscle pains  Neurologic: No weakness in the extremities. Psychiatric: No depression or suicidal thoughts. Endocrine: No heat or cold intolerances. Allergic/Immunologic: No current seasonal allergies; no skin hives. Physical Exam:      This a 62 y.o. male   There were no vitals filed for this visit. Constitutional: Patient in no acute distress. Neuro: alert and oriented to person place and time. Head: Atraumatic and normocephalic. Neck: Trachea midline. Ext: 2+ radial pulses bilaterally. Psych: Mood and affect normal.  Skin: No rashes or bruising present. Lungs: Respiratory effort normal.  Cardiovascular:  Regular rhythm. Abdomen: Soft, non-tender, non-distended. Neither side has CVA tenderness on exam.  Bladder non-tender and not distended. Lymphatics: no palpable lymphadenopathy  Pelvic exam: deferred. Rectal exam not indicated. Labs:  No results for input(s): WBC, HGB, HCT, MCV, PLT in the last 72 hours. No results for input(s): NA, K, CL, CO2, PHOS, BUN, CREATININE, CA in the last 72 hours. No results for input(s): COLORU, PHUR, LABCAST, WBCUA, RBCUA, MUCUS, TRICHOMONAS, YEAST, BACTERIA, CLARITYU, SPECGRAV, LEUKOCYTESUR, UROBILINOGEN, Huma Rubins in the last 72 hours. Invalid input(s): NITRATE, GLUCOSEUKETONESUAMORPHOUS        -----------------------------------------------------------------  Imaging Results:  No results found.     Assessment and Plan   Impression:  Bladder neck contracture      Plan:  OR for cystoscopy, transurethral incision of bladder neck contracture        Gabriela Garcia MD  3:57 PM 1/18/2022

## 2022-01-19 ENCOUNTER — HOSPITAL ENCOUNTER (OUTPATIENT)
Age: 62
Setting detail: OUTPATIENT SURGERY
Discharge: HOME | End: 2022-01-19
Attending: UROLOGY | Admitting: UROLOGY
Payer: COMMERCIAL

## 2022-01-19 ENCOUNTER — ANESTHESIA (OUTPATIENT)
Dept: OPERATING ROOM | Age: 62
End: 2022-01-19
Payer: COMMERCIAL

## 2022-01-19 ENCOUNTER — ANESTHESIA EVENT (OUTPATIENT)
Dept: OPERATING ROOM | Age: 62
End: 2022-01-19
Payer: COMMERCIAL

## 2022-01-19 VITALS
BODY MASS INDEX: 28.35 KG/M2 | OXYGEN SATURATION: 100 % | RESPIRATION RATE: 20 BRPM | HEIGHT: 70 IN | HEART RATE: 67 BPM | TEMPERATURE: 98.4 F | DIASTOLIC BLOOD PRESSURE: 91 MMHG | SYSTOLIC BLOOD PRESSURE: 130 MMHG | WEIGHT: 198 LBS

## 2022-01-19 VITALS — SYSTOLIC BLOOD PRESSURE: 130 MMHG | OXYGEN SATURATION: 99 % | TEMPERATURE: 97.7 F | DIASTOLIC BLOOD PRESSURE: 93 MMHG

## 2022-01-19 DIAGNOSIS — G89.18 POSTOPERATIVE PAIN: Primary | ICD-10-CM

## 2022-01-19 LAB
GFR NON-AFRICAN AMERICAN: >60 ML/MIN
GFR SERPL CREATININE-BSD FRML MDRD: >60 ML/MIN
GFR SERPL CREATININE-BSD FRML MDRD: NORMAL ML/MIN/{1.73_M2}
GLUCOSE BLD-MCNC: 87 MG/DL (ref 74–100)
POC BUN: 12 MG/DL (ref 8–26)
POC CREATININE: 0.81 MG/DL (ref 0.51–1.19)

## 2022-01-19 PROCEDURE — 3700000000 HC ANESTHESIA ATTENDED CARE: Performed by: UROLOGY

## 2022-01-19 PROCEDURE — 7100000001 HC PACU RECOVERY - ADDTL 15 MIN: Performed by: UROLOGY

## 2022-01-19 PROCEDURE — 7100000000 HC PACU RECOVERY - FIRST 15 MIN: Performed by: UROLOGY

## 2022-01-19 PROCEDURE — 93005 ELECTROCARDIOGRAM TRACING: CPT | Performed by: ANESTHESIOLOGY

## 2022-01-19 PROCEDURE — 7100000011 HC PHASE II RECOVERY - ADDTL 15 MIN: Performed by: UROLOGY

## 2022-01-19 PROCEDURE — 6360000002 HC RX W HCPCS

## 2022-01-19 PROCEDURE — C1758 CATHETER, URETERAL: HCPCS | Performed by: UROLOGY

## 2022-01-19 PROCEDURE — 3600000002 HC SURGERY LEVEL 2 BASE: Performed by: UROLOGY

## 2022-01-19 PROCEDURE — 2580000003 HC RX 258: Performed by: ANESTHESIOLOGY

## 2022-01-19 PROCEDURE — 82947 ASSAY GLUCOSE BLOOD QUANT: CPT

## 2022-01-19 PROCEDURE — A4217 STERILE WATER/SALINE, 500 ML: HCPCS | Performed by: UROLOGY

## 2022-01-19 PROCEDURE — 6360000002 HC RX W HCPCS: Performed by: STUDENT IN AN ORGANIZED HEALTH CARE EDUCATION/TRAINING PROGRAM

## 2022-01-19 PROCEDURE — 6360000002 HC RX W HCPCS: Performed by: NURSE ANESTHETIST, CERTIFIED REGISTERED

## 2022-01-19 PROCEDURE — 82565 ASSAY OF CREATININE: CPT

## 2022-01-19 PROCEDURE — 2720000010 HC SURG SUPPLY STERILE: Performed by: UROLOGY

## 2022-01-19 PROCEDURE — C1769 GUIDE WIRE: HCPCS | Performed by: UROLOGY

## 2022-01-19 PROCEDURE — 6360000002 HC RX W HCPCS: Performed by: ANESTHESIOLOGY

## 2022-01-19 PROCEDURE — 2580000003 HC RX 258: Performed by: UROLOGY

## 2022-01-19 PROCEDURE — 3700000001 HC ADD 15 MINUTES (ANESTHESIA): Performed by: UROLOGY

## 2022-01-19 PROCEDURE — 7100000010 HC PHASE II RECOVERY - FIRST 15 MIN: Performed by: UROLOGY

## 2022-01-19 PROCEDURE — 2709999900 HC NON-CHARGEABLE SUPPLY: Performed by: UROLOGY

## 2022-01-19 PROCEDURE — 3600000012 HC SURGERY LEVEL 2 ADDTL 15MIN: Performed by: UROLOGY

## 2022-01-19 PROCEDURE — 6370000000 HC RX 637 (ALT 250 FOR IP)

## 2022-01-19 PROCEDURE — 84520 ASSAY OF UREA NITROGEN: CPT

## 2022-01-19 PROCEDURE — 2500000003 HC RX 250 WO HCPCS: Performed by: NURSE ANESTHETIST, CERTIFIED REGISTERED

## 2022-01-19 RX ORDER — CEFADROXIL 500 MG/1
500 CAPSULE ORAL 2 TIMES DAILY
Qty: 6 CAPSULE | Refills: 0 | Status: SHIPPED | OUTPATIENT
Start: 2022-01-19 | End: 2022-01-22

## 2022-01-19 RX ORDER — HYDROCODONE BITARTRATE AND ACETAMINOPHEN 5; 325 MG/1; MG/1
TABLET ORAL
Status: COMPLETED
Start: 2022-01-19 | End: 2022-01-19

## 2022-01-19 RX ORDER — HYDROCODONE BITARTRATE AND ACETAMINOPHEN 5; 325 MG/1; MG/1
1 TABLET ORAL EVERY 6 HOURS PRN
Qty: 5 TABLET | Refills: 0 | Status: SHIPPED | OUTPATIENT
Start: 2022-01-19 | End: 2022-01-22

## 2022-01-19 RX ORDER — ONDANSETRON 2 MG/ML
INJECTION INTRAMUSCULAR; INTRAVENOUS PRN
Status: DISCONTINUED | OUTPATIENT
Start: 2022-01-19 | End: 2022-01-19 | Stop reason: SDUPTHER

## 2022-01-19 RX ORDER — FENTANYL CITRATE 50 UG/ML
INJECTION, SOLUTION INTRAMUSCULAR; INTRAVENOUS PRN
Status: DISCONTINUED | OUTPATIENT
Start: 2022-01-19 | End: 2022-01-19 | Stop reason: SDUPTHER

## 2022-01-19 RX ORDER — SODIUM CHLORIDE, SODIUM LACTATE, POTASSIUM CHLORIDE, CALCIUM CHLORIDE 600; 310; 30; 20 MG/100ML; MG/100ML; MG/100ML; MG/100ML
INJECTION, SOLUTION INTRAVENOUS CONTINUOUS
Status: DISCONTINUED | OUTPATIENT
Start: 2022-01-19 | End: 2022-01-19 | Stop reason: HOSPADM

## 2022-01-19 RX ORDER — HYOSCYAMINE SULFATE 0.12 MG/1
0.12 TABLET SUBLINGUAL EVERY 8 HOURS PRN
Qty: 20 EACH | Refills: 0 | Status: SHIPPED | OUTPATIENT
Start: 2022-01-19

## 2022-01-19 RX ORDER — MAGNESIUM HYDROXIDE 1200 MG/15ML
LIQUID ORAL PRN
Status: DISCONTINUED | OUTPATIENT
Start: 2022-01-19 | End: 2022-01-19 | Stop reason: ALTCHOICE

## 2022-01-19 RX ORDER — HYDROCODONE BITARTRATE AND ACETAMINOPHEN 5; 325 MG/1; MG/1
1 TABLET ORAL ONCE
Status: COMPLETED | OUTPATIENT
Start: 2022-01-19 | End: 2022-01-19

## 2022-01-19 RX ORDER — MAGNESIUM HYDROXIDE 1200 MG/15ML
LIQUID ORAL CONTINUOUS PRN
Status: COMPLETED | OUTPATIENT
Start: 2022-01-19 | End: 2022-01-19

## 2022-01-19 RX ORDER — DEXAMETHASONE SODIUM PHOSPHATE 10 MG/ML
INJECTION INTRAMUSCULAR; INTRAVENOUS PRN
Status: DISCONTINUED | OUTPATIENT
Start: 2022-01-19 | End: 2022-01-19 | Stop reason: SDUPTHER

## 2022-01-19 RX ORDER — LIDOCAINE HYDROCHLORIDE 10 MG/ML
INJECTION, SOLUTION EPIDURAL; INFILTRATION; INTRACAUDAL; PERINEURAL PRN
Status: DISCONTINUED | OUTPATIENT
Start: 2022-01-19 | End: 2022-01-19 | Stop reason: SDUPTHER

## 2022-01-19 RX ORDER — PROPOFOL 10 MG/ML
INJECTION, EMULSION INTRAVENOUS PRN
Status: DISCONTINUED | OUTPATIENT
Start: 2022-01-19 | End: 2022-01-19 | Stop reason: SDUPTHER

## 2022-01-19 RX ADMIN — HYDROMORPHONE HYDROCHLORIDE 0.25 MG: 1 INJECTION, SOLUTION INTRAMUSCULAR; INTRAVENOUS; SUBCUTANEOUS at 14:13

## 2022-01-19 RX ADMIN — LIDOCAINE HYDROCHLORIDE 50 MG: 10 INJECTION, SOLUTION EPIDURAL; INFILTRATION; INTRACAUDAL; PERINEURAL at 12:54

## 2022-01-19 RX ADMIN — FENTANYL CITRATE 25 MCG: 50 INJECTION, SOLUTION INTRAMUSCULAR; INTRAVENOUS at 13:05

## 2022-01-19 RX ADMIN — PROPOFOL 200 MG: 10 INJECTION, EMULSION INTRAVENOUS at 12:54

## 2022-01-19 RX ADMIN — ONDANSETRON 4 MG: 2 INJECTION, SOLUTION INTRAMUSCULAR; INTRAVENOUS at 13:33

## 2022-01-19 RX ADMIN — FENTANYL CITRATE 25 MCG: 50 INJECTION, SOLUTION INTRAMUSCULAR; INTRAVENOUS at 13:19

## 2022-01-19 RX ADMIN — HYDROMORPHONE HYDROCHLORIDE 0.25 MG: 1 INJECTION, SOLUTION INTRAMUSCULAR; INTRAVENOUS; SUBCUTANEOUS at 14:18

## 2022-01-19 RX ADMIN — HYDROCODONE BITARTRATE AND ACETAMINOPHEN 1 TABLET: 5; 325 TABLET ORAL at 14:53

## 2022-01-19 RX ADMIN — FENTANYL CITRATE 25 MCG: 50 INJECTION, SOLUTION INTRAMUSCULAR; INTRAVENOUS at 13:33

## 2022-01-19 RX ADMIN — HYDROMORPHONE HYDROCHLORIDE 0.5 MG: 1 INJECTION, SOLUTION INTRAMUSCULAR; INTRAVENOUS; SUBCUTANEOUS at 14:01

## 2022-01-19 RX ADMIN — HYDROMORPHONE HYDROCHLORIDE 0.5 MG: 1 INJECTION, SOLUTION INTRAMUSCULAR; INTRAVENOUS; SUBCUTANEOUS at 14:05

## 2022-01-19 RX ADMIN — HYDROMORPHONE HYDROCHLORIDE 0.25 MG: 1 INJECTION, SOLUTION INTRAMUSCULAR; INTRAVENOUS; SUBCUTANEOUS at 14:30

## 2022-01-19 RX ADMIN — HYDROMORPHONE HYDROCHLORIDE 0.25 MG: 1 INJECTION, SOLUTION INTRAMUSCULAR; INTRAVENOUS; SUBCUTANEOUS at 14:23

## 2022-01-19 RX ADMIN — DEXAMETHASONE SODIUM PHOSPHATE 10 MG: 10 INJECTION INTRAMUSCULAR; INTRAVENOUS at 13:00

## 2022-01-19 RX ADMIN — FENTANYL CITRATE 25 MCG: 50 INJECTION, SOLUTION INTRAMUSCULAR; INTRAVENOUS at 13:08

## 2022-01-19 RX ADMIN — CEFAZOLIN SODIUM 2000 MG: 10 INJECTION, POWDER, FOR SOLUTION INTRAVENOUS at 13:01

## 2022-01-19 RX ADMIN — SODIUM CHLORIDE, POTASSIUM CHLORIDE, SODIUM LACTATE AND CALCIUM CHLORIDE: 600; 310; 30; 20 INJECTION, SOLUTION INTRAVENOUS at 12:14

## 2022-01-19 RX ADMIN — PROPOFOL 100 MG: 10 INJECTION, EMULSION INTRAVENOUS at 12:55

## 2022-01-19 ASSESSMENT — PULMONARY FUNCTION TESTS
PIF_VALUE: 3
PIF_VALUE: 3
PIF_VALUE: 1
PIF_VALUE: 5
PIF_VALUE: 3
PIF_VALUE: 3
PIF_VALUE: 1
PIF_VALUE: 3
PIF_VALUE: 2
PIF_VALUE: 4
PIF_VALUE: 3
PIF_VALUE: 5
PIF_VALUE: 1
PIF_VALUE: 3
PIF_VALUE: 7
PIF_VALUE: 3
PIF_VALUE: 5
PIF_VALUE: 6
PIF_VALUE: 3
PIF_VALUE: 5
PIF_VALUE: 3
PIF_VALUE: 4
PIF_VALUE: 3
PIF_VALUE: 3
PIF_VALUE: 1
PIF_VALUE: 4
PIF_VALUE: 2
PIF_VALUE: 3
PIF_VALUE: 3
PIF_VALUE: 4
PIF_VALUE: 4
PIF_VALUE: 5
PIF_VALUE: 4
PIF_VALUE: 1
PIF_VALUE: 2
PIF_VALUE: 5
PIF_VALUE: 3
PIF_VALUE: 1
PIF_VALUE: 3
PIF_VALUE: 3
PIF_VALUE: 1
PIF_VALUE: 4
PIF_VALUE: 1
PIF_VALUE: 2
PIF_VALUE: 7
PIF_VALUE: 4
PIF_VALUE: 3
PIF_VALUE: 6
PIF_VALUE: 4

## 2022-01-19 ASSESSMENT — PAIN SCALES - GENERAL
PAINLEVEL_OUTOF10: 8
PAINLEVEL_OUTOF10: 6
PAINLEVEL_OUTOF10: 8
PAINLEVEL_OUTOF10: 6
PAINLEVEL_OUTOF10: 6
PAINLEVEL_OUTOF10: 0
PAINLEVEL_OUTOF10: 0
PAINLEVEL_OUTOF10: 6
PAINLEVEL_OUTOF10: 8
PAINLEVEL_OUTOF10: 6

## 2022-01-19 ASSESSMENT — PAIN DESCRIPTION - PAIN TYPE: TYPE: SURGICAL PAIN

## 2022-01-19 ASSESSMENT — PAIN DESCRIPTION - DESCRIPTORS: DESCRIPTORS: OTHER (COMMENT)

## 2022-01-19 ASSESSMENT — PAIN - FUNCTIONAL ASSESSMENT: PAIN_FUNCTIONAL_ASSESSMENT: 0-10

## 2022-01-19 NOTE — ANESTHESIA PRE PROCEDURE
Department of Anesthesiology  Preprocedure Note       Name:  Zachary Tineo   Age:  62 y.o.  :  1960                                          MRN:  [id number]         Date:  2022      Surgeon: Ele Mckenzie):  Cecy Banuelos MD    Procedure: Procedure(s):  CYSTOSCOPY TRANSURETHRAL INCISION BLADDER NECK CONTRACTURE  (KRISHNAN KNIFE)    Medications prior to admission:   Prior to Admission medications    Medication Sig Start Date End Date Taking? Authorizing Provider   ticagrelor (BRILINTA) 60 MG TABS tablet Take 60 mg by mouth 2 times daily Mgmt. Dr. Belinda Dumont held 1-15-22 for OR 22   Yes Historical Provider, MD   allopurinol (ZYLOPRIM) 100 MG tablet TAKE TWO TABLETS BY MOUTH EVERY DAY 22  Yes Wendy Nguyen MD   atorvastatin (LIPITOR) 40 MG tablet Take 40 mg by mouth nightly  21  Yes Historical Provider, MD   lisinopril (PRINIVIL;ZESTRIL) 2.5 MG tablet Take 1.25 mg by mouth nightly    Yes Historical Provider, MD   carvedilol (COREG) 3.125 MG tablet Take 3.125 mg by mouth 2 times daily (with meals)   Yes Historical Provider, MD   Cholecalciferol (VITAMIN D3) 5000 units TABS Take 5,000 Units by mouth daily    Yes Historical Provider, MD   vitamin C (ASCORBIC ACID) 500 MG tablet Take 500 mg by mouth daily   Yes Historical Provider, MD       Current medications:    Current Facility-Administered Medications   Medication Dose Route Frequency Provider Last Rate Last Admin    ceFAZolin (ANCEF) 2000 mg in dextrose 5 % 50 mL IVPB  2,000 mg IntraVENous Once Rosalina Winter MD           Allergies:     Allergies   Allergen Reactions    Morphine Rash       Problem List:    Patient Active Problem List   Diagnosis Code    Essential hypertension I10    History of heart attack I25.2    Mixed hyperlipidemia E78.2    Idiopathic gout M10.00    BPH without urinary obstruction N40.0    Vitamin D deficiency E55.9       Past Medical History:        Diagnosis Date    CAD (coronary artery disease) 5/10/2021 cath Dr. Ronald fuller/ last seen 8-2021    Cancer [facility])     basal cell just inside right nares    COVID-19 12/29/2021    dry cough, sneezing, headache    Enlarged prostate 2016    Family history of heart attack     Mother, Father, Sisters    Gout     Heart attack ([address] 02/2018    High triglycerides     Hyperlipidemia     Hypertension     Sleep apnea       Whit Lobo MD last seen 1/5/2021/[place]- Cpap machine    Wears glasses     Wellness examination     PCP Kenan Sheffield MD/ alina/ last seen 8-2021       Past Surgical History:        Procedure Laterality Date    CARDIAC CATHETERIZATION  05/10/2021    Minimal disease to LCX and RCA medical tx    CORONARY AN[facility]OPLASTY  2018    [facility]    INGUINAL HERNIA REPAIR Bilateral     KNEE ARTHROSCOPY Right 2012    miniscus    KNEE CARTILAGE SURGERY Left     SEPTOPLASTY Bilateral 01/16/2017    SHOULDER ARTHROSCOPY Left 1995 ?     SKIN BIOPSY Right     just inside right nares    TURBINATE RESECTION Bilateral 01/16/2017    TURP N/A 8/24/2021    CYSTOSCOPY GREEN LIGHT LASER OF PROSTATE - FORTEC performed by Salomon Whelan MD at [place] [address] History:    Social History     Tobacco Use    Smoking status: Never Smoker    Smokeless tobacco: Never Used   Substance Use Topics    Alcohol use: Yes     Comment: 2 X weekly 2-3 drinks                                Counseling given: Not Answered      Vital Signs (Current):   Vitals:    01/17/22 1456   Weight: 198 lb (89.8 kg)   Height: 5' 10\" (1.778 m)                                              BP Readings from Last 3 Encounters:   08/24/21 (!) 144/83   08/24/21 131/85   08/13/21 118/76       NPO Status: Time of last liquid consumption: 0800                        Time of last solid consumption: 1800                        Date of last liquid consumption: 01/19/22                        Date of last solid food consumption: 01/18/22    BMI:   Wt Readings from Last 3 Encounters:   01/17/22 198 lb (89.8 kg)   08/24/21 200 lb (90.7 kg)   08/13/21 198 lb (89.8 kg)     Body mass index is 28.41 kg/m². CBC:   Lab Results   Component Value Date    WBC 7.0 02/09/2021    RBC 5.68 02/09/2021    HGB 16.5 02/09/2021    HCT 52.3 02/09/2021    MCV 92.1 02/09/2021    RDW 12.2 02/09/2021     05/10/2021       CMP:   Lab Results   Component Value Date     02/09/2021    K 4.4 02/09/2021     02/09/2021    CO2 23 02/09/2021    BUN 13 02/09/2021    CREATININE 0.87 05/10/2021    CREATININE 0.70 02/09/2021    CREATININE 0.8 06/01/2018    GFRAA >60 02/09/2021    LABGLOM >60 05/10/2021    PROT 8.1 02/09/2021    CALCIUM 9.5 02/09/2021    BILITOT 0.93 02/09/2021    ALKPHOS 104 02/09/2021    AST 23 02/09/2021    ALT 22 02/09/2021       POC Tests: No results for input(s): POCGLU, POCNA, POCK, POCCL, POCBUN, POCHEMO, POCHCT in the last 72 hours.     Coags: No results found for: PROTIME, INR, APTT    HCG (If Applicable): No results found for: PREGTESTUR, PREGSERUM, HCG, HCGQUANT     ABGs: No results found for: PHART, PO2ART, KDB7OWQ, NTA7KFR, BEART, B7TSPTII     Type & Screen (If Applicable):  No results found for: LABABO, LABRH    Drug/Infectious Status (If Applicable):  No results found for: HIV, HEPCAB    COVID-19 Screening (If Applicable):   Lab Results   Component Value Date    COVID19 Not Detected 10/29/2020           Anesthesia Evaluation  Patient summary reviewed and Nursing notes reviewed no history of anesthetic complications:   Airway: Mallampati: II  TM distance: >3 FB   Neck ROM: full  Mouth opening: > = 3 FB Dental: normal exam         Pulmonary:normal exam    (+) sleep apnea:                             Cardiovascular:  Exercise tolerance: good (>4 METS),   (+) hypertension: moderate and no interval change, past MI: > 6 months and no interval change, CAD: non-obstructive and no interval change, CABG/stent: no interval change, (-)  angina,  CHF and orthopnea    ECG reviewed  Rhythm: regular  Rate: normal           Beta Blocker:  Dose within 24 Hrs         Neuro/Psych:   Negative Neuro/Psych ROS              GI/Hepatic/Renal: Neg GI/Hepatic/Renal ROS            Endo/Other: Negative Endo/Other ROS                    Abdominal:             Vascular: Other Findings:           Anesthesia Plan      general     ASA 3     (LMA)  Induction: intravenous. MIPS: Postoperative opioids intended and Prophylactic antiemetics administered. Anesthetic plan and risks discussed with patient.       Plan discussed with CRNA and surgical team.                Marva Cortez MD   1/19/2022

## 2022-01-19 NOTE — OP NOTE
Operative Note      Patient: Reuben Wesley  YOB: 1960  MRN: [id number]    Date of Procedure: 1/19/2022    Pre-Op Diagnosis: Bladder neck contracture, history of BPH status post greenlight photo vaporization of the prostate    Post-Op Diagnosis: Meatal stenosis, bladder neck contracture, history of BPH status post greenlight photo vaporization of the prostate       Procedure(s):  1. Cystourethroscopy  2. Transurethral incision of bladder neck contracture using Macedo knife  3. Meatal dilation using Lambert Haymaker sounds and urethral dilation using S dilators  4. Trivedi catheter placement    Surgeon(s):  Lacey Rosas MD    Assistant:   Resident: David Moraes MD, Mak Wylie MD-PGY3    Anesthesia: General    Estimated Blood Loss (mL): Minimal    Complications: None    Specimens:   * No specimens in log *    Implants:  * No implants in log *      Drains:   Urethral Catheter Coude;Non-latex 22 fr (Active)       [REMOVED] Urethral Catheter Latex; Triple-lumen;Straight-tip 20 fr (Removed)     Indications:  Patient is a 71-year-old male who has a history of BPH and underwent greenlight photo vaporization of the prostate in August 2021. Over the last 2 months he has had progressively worsening lower urinary tract symptoms with concern of incomplete emptying. There was suspicion of possible bladder neck contracture. He presents today for transurethral incision of bladder neck contracture. Risk, benefits, and alternatives were discussed with the patient and he elected to proceed. Informed consent was obtained. Findings: Meatal stenosis and area of narrowing in the bulbar urethra just outside the external sphincter, bladder neck contracture, ureteral orifices in orthotopic position and normal    Detailed Description of Procedure:   Patient was taken to the operating room and transferred to the operating room table.   General anesthesia was induced appropriately and preoperative antibiotics consisting of Ancef 2 g IV were administered. EPC cuffs were placed and confirmed to be working. The patient was prepped and draped in the usual sterile fashion after being placed in dorsolithotomy position. Surgical timeout using 2 patient identifiers was performed. We began by attempting to advance a visual obturator, however there was noted to be meatal stenosis. We therefore dilated the meatus from 22-30 [place] using [employer]. We were then able to insert a 24 Peruvian visual obturator and advanced this to the level of the bulbar urethra where the encountered an area of narrowing just adjacent to the external sphincter. Because we did not want to raise a flap here, we elected to place a wire into the bladder and proceeded to use S dilators from 22-26 [place] to dilate this area as well as the suspected bladder neck contracture appropriately. We then reinserted our scope and were able to advance past this area and into the bladder. We did identify an area of scar tissue along the bladder neck concerning for bladder neck contracture. We identified both ureteral orifices which were far away from the bladder neck and normal.  We then proceeded to insert a resectoscope with a Macedo knife and in a standard fashion at the 12, 5, and 7:00 positions, we made incisions down the perivesical fat on the bladder neck at the 5 and 7 o'clock position and gently resected the anterior portion, but not daily. Bladder neck opened up nicely. Bleeding was controlled with cautery. Once good hemostasis was achieved, both ureteral orifices were inspected and noted to be safely uninvolved in the resection. We then removed our scope and a well-lubricated 22 Peruvian three-way Trivedi catheter was placed appropriately in the bladder and 15 mL of sterile water was placed in the balloon. A catheter plug was placed on the third port and this was attached to dependent drainage. The procedure was then terminated.   Patient tolerated procedure well without complication was taken to PACU in stable condition. Dr. Jaime Deluna was present for the duration of the procedure. Plan:  Patient will be discharged in the postoperative recovery area and will follow-up in 1 week for catheter removal in the office.     Electronically signed by Anne-Marie Cummings MD on 1/19/2022 at 1:48 PM

## 2022-01-20 LAB
EKG ATRIAL RATE: 60 BPM
EKG P AXIS: 28 DEGREES
EKG P-R INTERVAL: 170 MS
EKG Q-T INTERVAL: 430 MS
EKG QRS DURATION: 80 MS
EKG QTC CALCULATION (BAZETT): 430 MS
EKG R AXIS: 2 DEGREES
EKG T AXIS: 45 DEGREES
EKG VENTRICULAR RATE: 60 BPM

## 2022-01-20 PROCEDURE — 93010 ELECTROCARDIOGRAM REPORT: CPT | Performed by: INTERNAL MEDICINE

## 2022-01-21 NOTE — ANESTHESIA POSTPROCEDURE EVALUATION
Department of Anesthesiology  Postprocedure Note    Patient: Nikko Mccallum  MRN: [id number]  [place]: 1960  Date of evaluation: 1/20/2022  Time:  7:33 PM     Procedure Summary     Date: 01/19/22 Room / Location: [address]    Anesthesia Start: 0639 Anesthesia Stop: 5242    Procedure: CYSTOSCOPY TRANSURETHRAL INCISION BLADDER NECK CONTRACTURE  (KRISHNAN KNIFE) (N/A ) Diagnosis: (BPH)    Surgeons: Magdalena Arce MD Responsible Provider: Roseanne Sebastian MD    Anesthesia Type: general ASA Status: 3          Anesthesia Type: general    Brigid Phase I: Brigid Score: 10    Brigid Phase II: Brigid Score: 10    Last vitals: Reviewed and per EMR flowsheets.        Anesthesia Post Evaluation    Patient location during evaluation: PACU  Patient participation: complete - patient participated  Level of consciousness: awake and alert  Pain score: 3  Airway patency: patent  Nausea & Vomiting: no nausea and no vomiting  Complications: no  Cardiovascular status: hemodynamically stable  Respiratory status: room air  Hydration status: euvolemic

## 2022-02-21 ENCOUNTER — HOSPITAL ENCOUNTER (OUTPATIENT)
Age: 62
Setting detail: SPECIMEN
Discharge: HOME | End: 2022-02-21

## 2022-02-21 DIAGNOSIS — E78.2 MIXED HYPERLIPIDEMIA: ICD-10-CM

## 2022-02-21 DIAGNOSIS — Z00.00 WELL ADULT EXAM: ICD-10-CM

## 2022-02-21 DIAGNOSIS — I25.2 HISTORY OF MI (MYOCARDIAL INFARCTION): ICD-10-CM

## 2022-02-21 LAB
ALBUMIN SERPL-MCNC: 4.5 G/DL (ref 3.5–5.2)
ALBUMIN/GLOBULIN RATIO: 1.7 (ref 1–2.5)
ALP BLD-CCNC: 98 U/L (ref 40–129)
ALT SERPL-CCNC: 38 U/L (ref 5–41)
ANION GAP SERPL CALCULATED.3IONS-SCNC: 15 MMOL/L (ref 9–17)
AST SERPL-CCNC: 24 U/L
BILIRUB SERPL-MCNC: 1.14 MG/DL (ref 0.3–1.2)
BUN BLDV-MCNC: 14 MG/DL (ref 8–23)
CALCIUM SERPL-MCNC: 9.6 MG/DL (ref 8.6–10.4)
CHLORIDE BLD-SCNC: 103 MMOL/L (ref 98–107)
CHOLESTEROL/HDL RATIO: 4.4
CHOLESTEROL: 167 MG/DL
CO2: 23 MMOL/L (ref 20–31)
CREAT SERPL-MCNC: 0.71 MG/DL (ref 0.7–1.2)
GFR AFRICAN AMERICAN: >60 ML/MIN
GFR NON-AFRICAN AMERICAN: >60 ML/MIN
GFR SERPL CREATININE-BSD FRML MDRD: NORMAL ML/MIN/{1.73_M2}
GLUCOSE FASTING: 84 MG/DL (ref 70–99)
HCT VFR BLD CALC: 49.8 % (ref 40.7–50.3)
HDLC SERPL-MCNC: 38 MG/DL
HEMOGLOBIN: 16.8 G/DL (ref 13–17)
LDL CHOLESTEROL: 63 MG/DL (ref 0–130)
MCH RBC QN AUTO: 30.5 PG (ref 25.2–33.5)
MCHC RBC AUTO-ENTMCNC: 33.7 G/DL (ref 28.4–34.8)
MCV RBC AUTO: 90.4 FL (ref 82.6–102.9)
NRBC AUTOMATED: 0 PER 100 WBC
PDW BLD-RTO: 12.5 % (ref 11.8–14.4)
PLATELET # BLD: 223 K/UL (ref 138–453)
PMV BLD AUTO: 11 FL (ref 8.1–13.5)
POTASSIUM SERPL-SCNC: 4.6 MMOL/L (ref 3.7–5.3)
RBC # BLD: 5.51 M/UL (ref 4.21–5.77)
SODIUM BLD-SCNC: 141 MMOL/L (ref 135–144)
TOTAL PROTEIN: 7.1 G/DL (ref 6.4–8.3)
TRIGL SERPL-MCNC: 330 MG/DL
TSH SERPL DL<=0.05 MIU/L-ACNC: 0.99 MIU/L (ref 0.3–5)
VITAMIN B-12: 544 PG/ML (ref 232–1245)
VITAMIN D 25-HYDROXY: 41.1 NG/ML (ref 30–100)
WBC # BLD: 6.3 K/UL (ref 3.5–11.3)

## 2022-02-23 LAB — T4 TOTAL: 7 UG/DL (ref 4.5–10.9)

## 2022-02-26 LAB — LIPOPROTEIN (A): 120 MG/DL

## 2022-07-20 ENCOUNTER — HOSPITAL ENCOUNTER (OUTPATIENT)
Age: 62
Setting detail: SPECIMEN
Discharge: HOME | End: 2022-07-20

## 2022-07-20 LAB
ALT SERPL-CCNC: 21 U/L (ref 5–41)
AST SERPL-CCNC: 21 U/L
CHOLESTEROL/HDL RATIO: 4.3
CHOLESTEROL: 166 MG/DL
HDLC SERPL-MCNC: 39 MG/DL
LDL CHOLESTEROL: 105 MG/DL (ref 0–130)
PROSTATE SPECIFIC ANTIGEN: 0.58 NG/ML
TRIGL SERPL-MCNC: 111 MG/DL

## 2022-07-22 LAB — LIPOPROTEIN (A): 150 MG/DL

## 2022-12-14 ENCOUNTER — HOSPITAL ENCOUNTER (OUTPATIENT)
Age: 62
Setting detail: SPECIMEN
Discharge: HOME | End: 2022-12-14

## 2022-12-14 LAB
ALT SERPL-CCNC: 26 U/L (ref 5–41)
AST SERPL-CCNC: 25 U/L
CHOLESTEROL, FASTING: 170 MG/DL
CHOLESTEROL/HDL RATIO: 4.6
HDLC SERPL-MCNC: 37 MG/DL
LDL CHOLESTEROL: 94 MG/DL (ref 0–130)
TRIGLYCERIDE, FASTING: 197 MG/DL

## 2023-02-09 ENCOUNTER — ANESTHESIA EVENT (OUTPATIENT)
Dept: OPERATING ROOM | Age: 63
End: 2023-02-09
Payer: COMMERCIAL

## 2023-02-10 ENCOUNTER — ANESTHESIA (OUTPATIENT)
Dept: OPERATING ROOM | Age: 63
End: 2023-02-10
Payer: COMMERCIAL

## 2023-02-10 ENCOUNTER — HOSPITAL ENCOUNTER (OUTPATIENT)
Age: 63
Setting detail: OUTPATIENT SURGERY
Discharge: HOME | End: 2023-02-10
Attending: COLON & RECTAL SURGERY | Admitting: COLON & RECTAL SURGERY
Payer: COMMERCIAL

## 2023-02-10 VITALS
DIASTOLIC BLOOD PRESSURE: 84 MMHG | HEIGHT: 70 IN | HEART RATE: 66 BPM | SYSTOLIC BLOOD PRESSURE: 118 MMHG | OXYGEN SATURATION: 96 % | BODY MASS INDEX: 27.83 KG/M2 | WEIGHT: 194.4 LBS | TEMPERATURE: 97.3 F | RESPIRATION RATE: 17 BRPM

## 2023-02-10 DIAGNOSIS — Z86.010 HX OF ADENOMATOUS POLYP OF COLON: ICD-10-CM

## 2023-02-10 DIAGNOSIS — Z12.11 SCREENING FOR COLON CANCER: ICD-10-CM

## 2023-02-10 PROCEDURE — 3700000001 HC ADD 15 MINUTES (ANESTHESIA): Performed by: COLON & RECTAL SURGERY

## 2023-02-10 PROCEDURE — 3700000000 HC ANESTHESIA ATTENDED CARE: Performed by: COLON & RECTAL SURGERY

## 2023-02-10 PROCEDURE — 7100000011 HC PHASE II RECOVERY - ADDTL 15 MIN: Performed by: COLON & RECTAL SURGERY

## 2023-02-10 PROCEDURE — 88305 TISSUE EXAM BY PATHOLOGIST: CPT

## 2023-02-10 PROCEDURE — 2500000003 HC RX 250 WO HCPCS: Performed by: SPECIALIST

## 2023-02-10 PROCEDURE — 2580000003 HC RX 258: Performed by: ANESTHESIOLOGY

## 2023-02-10 PROCEDURE — 2709999900 HC NON-CHARGEABLE SUPPLY: Performed by: COLON & RECTAL SURGERY

## 2023-02-10 PROCEDURE — 6360000002 HC RX W HCPCS: Performed by: SPECIALIST

## 2023-02-10 PROCEDURE — 7100000010 HC PHASE II RECOVERY - FIRST 15 MIN: Performed by: COLON & RECTAL SURGERY

## 2023-02-10 PROCEDURE — 3609010300 HC COLONOSCOPY W/BIOPSY SINGLE/MULTIPLE: Performed by: COLON & RECTAL SURGERY

## 2023-02-10 RX ORDER — SODIUM CHLORIDE, SODIUM LACTATE, POTASSIUM CHLORIDE, CALCIUM CHLORIDE 600; 310; 30; 20 MG/100ML; MG/100ML; MG/100ML; MG/100ML
INJECTION, SOLUTION INTRAVENOUS CONTINUOUS
Status: DISCONTINUED | OUTPATIENT
Start: 2023-02-10 | End: 2023-02-10 | Stop reason: HOSPADM

## 2023-02-10 RX ORDER — LIDOCAINE HYDROCHLORIDE 20 MG/ML
INJECTION, SOLUTION EPIDURAL; INFILTRATION; INTRACAUDAL; PERINEURAL PRN
Status: DISCONTINUED | OUTPATIENT
Start: 2023-02-10 | End: 2023-02-10 | Stop reason: SDUPTHER

## 2023-02-10 RX ORDER — SODIUM CHLORIDE 9 MG/ML
INJECTION, SOLUTION INTRAVENOUS PRN
Status: DISCONTINUED | OUTPATIENT
Start: 2023-02-10 | End: 2023-02-10 | Stop reason: HOSPADM

## 2023-02-10 RX ORDER — SODIUM CHLORIDE 9 MG/ML
INJECTION, SOLUTION INTRAVENOUS CONTINUOUS
Status: DISCONTINUED | OUTPATIENT
Start: 2023-02-10 | End: 2023-02-10

## 2023-02-10 RX ORDER — PROPOFOL 10 MG/ML
INJECTION, EMULSION INTRAVENOUS PRN
Status: DISCONTINUED | OUTPATIENT
Start: 2023-02-10 | End: 2023-02-10 | Stop reason: SDUPTHER

## 2023-02-10 RX ORDER — SODIUM CHLORIDE 0.9 % (FLUSH) 0.9 %
5-40 SYRINGE (ML) INJECTION PRN
Status: DISCONTINUED | OUTPATIENT
Start: 2023-02-10 | End: 2023-02-10 | Stop reason: HOSPADM

## 2023-02-10 RX ORDER — UBIDECARENONE 10 MG
10 CAPSULE ORAL DAILY
COMMUNITY

## 2023-02-10 RX ORDER — LIDOCAINE HYDROCHLORIDE 10 MG/ML
1 INJECTION, SOLUTION EPIDURAL; INFILTRATION; INTRACAUDAL; PERINEURAL
Status: DISCONTINUED | OUTPATIENT
Start: 2023-02-11 | End: 2023-02-10 | Stop reason: HOSPADM

## 2023-02-10 RX ORDER — SODIUM CHLORIDE 0.9 % (FLUSH) 0.9 %
5-40 SYRINGE (ML) INJECTION EVERY 12 HOURS SCHEDULED
Status: DISCONTINUED | OUTPATIENT
Start: 2023-02-10 | End: 2023-02-10 | Stop reason: HOSPADM

## 2023-02-10 RX ADMIN — PROPOFOL 50 MG: 10 INJECTION, EMULSION INTRAVENOUS at 08:06

## 2023-02-10 RX ADMIN — SODIUM CHLORIDE, POTASSIUM CHLORIDE, SODIUM LACTATE AND CALCIUM CHLORIDE: 600; 310; 30; 20 INJECTION, SOLUTION INTRAVENOUS at 06:35

## 2023-02-10 RX ADMIN — PROPOFOL 50 MG: 10 INJECTION, EMULSION INTRAVENOUS at 08:11

## 2023-02-10 RX ADMIN — PROPOFOL 50 MG: 10 INJECTION, EMULSION INTRAVENOUS at 08:17

## 2023-02-10 RX ADMIN — PROPOFOL 50 MG: 10 INJECTION, EMULSION INTRAVENOUS at 07:50

## 2023-02-10 RX ADMIN — PROPOFOL 50 MG: 10 INJECTION, EMULSION INTRAVENOUS at 07:33

## 2023-02-10 RX ADMIN — LIDOCAINE HYDROCHLORIDE 50 MG: 20 INJECTION, SOLUTION EPIDURAL; INFILTRATION; INTRACAUDAL at 07:33

## 2023-02-10 RX ADMIN — PROPOFOL 50 MG: 10 INJECTION, EMULSION INTRAVENOUS at 07:43

## 2023-02-10 RX ADMIN — PROPOFOL 50 MG: 10 INJECTION, EMULSION INTRAVENOUS at 07:35

## 2023-02-10 RX ADMIN — PROPOFOL 50 MG: 10 INJECTION, EMULSION INTRAVENOUS at 08:03

## 2023-02-10 RX ADMIN — PROPOFOL 50 MG: 10 INJECTION, EMULSION INTRAVENOUS at 07:58

## 2023-02-10 RX ADMIN — PROPOFOL 50 MG: 10 INJECTION, EMULSION INTRAVENOUS at 07:38

## 2023-02-10 RX ADMIN — PROPOFOL 50 MG: 10 INJECTION, EMULSION INTRAVENOUS at 08:01

## 2023-02-10 RX ADMIN — PROPOFOL 50 MG: 10 INJECTION, EMULSION INTRAVENOUS at 07:52

## 2023-02-10 RX ADMIN — PROPOFOL 50 MG: 10 INJECTION, EMULSION INTRAVENOUS at 07:56

## 2023-02-10 RX ADMIN — PROPOFOL 50 MG: 10 INJECTION, EMULSION INTRAVENOUS at 08:15

## 2023-02-10 RX ADMIN — PROPOFOL 50 MG: 10 INJECTION, EMULSION INTRAVENOUS at 07:47

## 2023-02-10 RX ADMIN — PROPOFOL 50 MG: 10 INJECTION, EMULSION INTRAVENOUS at 07:37

## 2023-02-10 ASSESSMENT — PAIN - FUNCTIONAL ASSESSMENT: PAIN_FUNCTIONAL_ASSESSMENT: 0-10

## 2023-02-10 NOTE — ANESTHESIA PRE PROCEDURE
Department of Anesthesiology  Preprocedure Note       Name:  Nelda Keating   Age:  65 y.o.  :  1960                                          MRN:  [id number]         Date:  2/10/2023      Surgeon: Oscar Neumann):  Bimal Fritz MD    Procedure: Procedure(s):  COLORECTAL CANCER SCREENING, NOT HIGH RISK    Medications prior to admission:   Prior to Admission medications    Medication Sig Start Date End Date Taking? Authorizing Provider   Coenzyme Q10 10 MG CAPS Take 10 mg by mouth daily   Yes Historical Provider, MD   allopurinol (ZYLOPRIM) 100 MG tablet TAKE TWO TABLETS BY MOUTH EVERY DAY 22   Rukhsana Agarwal MD   gemfibrozil (LOPID) 600 MG tablet  22   Historical Provider, MD   nitroGLYCERIN (NITROSTAT) 0.4 MG SL tablet  22   Historical Provider, MD   ticagrelor (BRILINTA) 60 MG TABS tablet Take 60 mg by mouth 2 times daily Mgmt.  Dr. Sarah to 1-15-22 for OR 22    Historical Provider, MD   atorvastatin (LIPITOR) 40 MG tablet Take 80 mg by mouth nightly 21   Historical Provider, MD   lisinopril (PRINIVIL;ZESTRIL) 2.5 MG tablet Take 1.25 mg by mouth nightly     Historical Provider, MD   carvedilol (COREG) 3.125 MG tablet Take 3.125 mg by mouth 2 times daily (with meals)    Historical Provider, MD   Cholecalciferol (VITAMIN D3) 5000 units TABS Take 5,000 Units by mouth daily     Historical Provider, MD   vitamin C (ASCORBIC ACID) 500 MG tablet Take 500 mg by mouth daily    Historical Provider, MD       Current medications:    Current Facility-Administered Medications   Medication Dose Route Frequency Provider Last Rate Last Admin    [START ON 2023] lidocaine PF 1 % injection 1 mL  1 mL IntraDERmal Once PRN Tylene Richters, DO        0.9 % sodium chloride infusion   IntraVENous Continuous Gianluca Morocho DO        lactated ringers IV soln infusion   IntraVENous Continuous Tylene Richters,  mL/hr at 02/10/23 0635 New Bag at 02/10/23 0635    sodium chloride flush 0.9 % injection 5-40 mL  5-40 mL IntraVENous 2 times per day Gianluca Morocho, DO        sodium chloride flush 0.9 % injection 5-40 mL  5-40 mL IntraVENous PRN Brigitte Conradi, DO        0.9 % sodium chloride infusion   IntraVENous PRN [place]ford Conradi, DO           Allergies: Allergies   Allergen Reactions    Morphine Rash       Problem List:    Patient Active Problem List   Diagnosis Code    Essential hypertension I10    History of heart attack I25.2    Mixed hyperlipidemia E78.2    Idiopathic gout M10.00    BPH without urinary obstruction N40.0    Vitamin D deficiency E55.9       Past Medical History:        Diagnosis Date    CAD (coronary artery disease)     5/10/2021 cath Dr. Bhargavi fuller/ last seen 8-2021    [facility] [address] [address]     basal cell just inside right nares    COVID-19 12/29/2021    dry cough, sneezing, headache    Enlarged prostate 2016    Family history of heart attack     Mother, Father, Sisters    Gout     Heart attack ([facility] [address] 02/2018    High triglycerides     Hyperlipidemia     Hypertension     Sleep apnea       Noman Victor MD last seen 1/5/2021/[place]- Cpap machine    Wears glasses     Wellness examination     PCP Yossi Hooks MD/ [place]/ last seen 8-2021       Past Surgical History:        Procedure Laterality Date    CARDIAC CATHETERIZATION  05/10/2021    Minimal disease to LCX and RCA medical tx    CORONARY ANGIOPLASTY  2018    Stents x2 to LAD in [address] 1/19/2022    [address]  ([place]) performed by Stella Ruiz MD at [address] Bilateral '85, '93    INGUINAL HERNIA REPAIR Bilateral     KNEE ARTHROSCOPY Right 2012    miniscus    KNEE CARTILAGE SURGERY Left     OTHER SURGICAL HISTORY  01/19/2022    CYSTOSCOPY TRANSURETHRAL INCISION BLADDER NECK CONTRACTURE      SEPTOPLASTY Bilateral 01/16/2017    SHOULDER ARTHROSCOPY Left 1995 ?     SKIN BIOPSY Right     just inside right nares    TURBINATE RESECTION Bilateral 01/16/2017    TURP N/A 8/24/2021    CYSTOSCOPY GREEN LIGHT LASER OF PROSTATE - FORTEC performed by Uvaldo Owens MD at [place] [address] History:    Social History     Tobacco Use    Smoking status: Never    Smokeless tobacco: Never   Substance Use Topics    Alcohol use: Yes     Comment: 2 X weekly 2-3 drinks                                Counseling given: Not Answered      Vital Signs (Current):   Vitals:    02/10/23 0611 02/10/23 0623   BP: 126/87    Pulse: 73    Resp: 18    Temp: 97.1 °F (36.2 °C)    TempSrc: Temporal    SpO2: 98%    Weight:  194 lb 6.4 oz (88.2 kg)   Height:  5' 10\" (1.778 m)                                              BP Readings from Last 3 Encounters:   02/10/23 126/87   08/23/22 118/68   02/21/22 118/62       NPO Status: Time of last liquid consumption: 2230                        Time of last solid consumption: 2100                        Date of last liquid consumption: 02/09/23                        Date of last solid food consumption: 02/08/23    BMI:   Wt Readings from Last 3 Encounters:   02/10/23 194 lb 6.4 oz (88.2 kg)   08/23/22 199 lb (90.3 kg)   02/21/22 200 lb (90.7 kg)     Body mass index is 27.89 kg/m².     CBC:   Lab Results   Component Value Date/Time    WBC 6.3 02/21/2022 08:55 AM    RBC 5.51 02/21/2022 08:55 AM    HGB 16.8 02/21/2022 08:55 AM    HCT 49.8 02/21/2022 08:55 AM    MCV 90.4 02/21/2022 08:55 AM    RDW 12.5 02/21/2022 08:55 AM     02/21/2022 08:55 AM       CMP:   Lab Results   Component Value Date/Time     02/21/2022 08:55 AM    K 4.6 02/21/2022 08:55 AM     02/21/2022 08:55 AM    CO2 23 02/21/2022 08:55 AM    BUN 14 02/21/2022 08:55 AM    CREATININE 0.71 02/21/2022 08:55 AM    CREATININE 0.8 06/01/2018 12:00 AM    GFRAA >60 02/21/2022 08:55 AM    LABGLOM >60 02/21/2022 08:55 AM    PROT 7.1 02/21/2022 08:55 AM    CALCIUM 9.6 02/21/2022 08:55 AM    BILITOT 1.14 02/21/2022 08:55 AM ALKPHOS 98 02/21/2022 08:55 AM    AST 25 12/14/2022 10:06 AM    ALT 26 12/14/2022 10:06 AM       POC Tests: No results for input(s): POCGLU, POCNA, POCK, POCCL, POCBUN, POCHEMO, POCHCT in the last 72 hours. Coags: No results found for: PROTIME, INR, APTT    HCG (If Applicable): No results found for: PREGTESTUR, PREGSERUM, HCG, HCGQUANT     ABGs: No results found for: PHART, PO2ART, GSF1HGY, BGB4KPT, BEART, E2FEDVVA     Type & Screen (If Applicable):  No results found for: LABABO, LABRH    Drug/Infectious Status (If Applicable):  No results found for: HIV, HEPCAB    COVID-19 Screening (If Applicable):   Lab Results   Component Value Date/Time    COVID19 Not Detected 10/29/2020 07:30 AM           Anesthesia Evaluation  Patient summary reviewed no history of anesthetic complications:   Airway: Mallampati: I          Dental: normal exam         Pulmonary:   (+) sleep apnea: on CPAP,      (-) COPD and asthma                           Cardiovascular:  Exercise tolerance: good (>4 METS),   (+) hypertension:, past MI: > 6 months, CAD:, CABG/stent (stents x2 2018):, hyperlipidemia    (-) valvular problems/murmurs, dysrhythmias and  angina                Neuro/Psych:      (-) seizures and CVA           GI/Hepatic/Renal:        (-) GERD, liver disease and no renal disease       Endo/Other:        (-) diabetes mellitus, hypothyroidism               Abdominal:             Vascular:     - DVT and PE. Other Findings:           Anesthesia Plan      MAC     ASA 3       Induction: intravenous. MIPS: prophylactic pharmacologic antiemetic agents not administered perioperatively for documented reasons. Anesthetic plan and risks discussed with patient. Plan discussed with CRNA.     Attending anesthesiologist reviewed and agrees with Wilfredo Avila MD   2/10/2023

## 2023-02-10 NOTE — OP NOTE
Operative Note      Patient: Maxx Andrade  YOB: 1960  MRN: [id number]    Date of Procedure: 2/10/2023    Pre-Op Diagnosis: Hx of adenomatous polyp of colon [Z86.010]  Screening for colon cancer [Z12.11]    Post-Op Diagnosis: Same and mild sigmoid diverticulosis, grade I internal hemorrhoids       Procedure(s):  COLONOSCOPY WITH BIOPSY    Surgeon(s):  Mallory Cartwright MD    Assistant:   Resident: Michael Cm DO    Anesthesia: Monitor Anesthesia Care    Estimated Blood Loss (mL): Minimal    Complications: None    Specimens:   ID Type Source Tests Collected by Time Destination   A : TRANSVERSE COLON POLYP  Tissue Colon SURGICAL PATHOLOGY Mallory Cartwright MD 2/10/2023 0749        Implants:  * No implants in log *      Drains: * No LDAs found *    Findings: 2x transverse colon polyp taken with hot biopsy forceps, 1x cecal polyp taken with hot biopsy forceps, 1x descending colon polyp cauterized, mild sigmoid diverticulosis, grade I internal hemorrhoids    Indication: Patient is a 58y male with history of prior colon polyps on colonoscopy here for further screening. Procedure and risks, complications, alternatives were discussed with patient and attending, informed consent obtained. Patient off his brilinta. Detailed Description of Procedure: The patient was given monitored anesthesia care. The patient was given oxygen by nasal cannula. External anal exam and PREMA performed noting no masses, strictures, or bleeding. The colonoscope was inserted per rectum and advanced under direct vision to the cecum with minor difficulty due to tortuous colon. Bowel prep was good.       Findings:  Cecum/Ascending colon: abnormal: 1x polyp taken w/ hot forceps    Transverse colon: abnormal: 2x polyps taken w/ hot forceps    Descending/Sigmoid colon: abnormal: 1x polyp cauterized, mild sigmoid diverticulosis    Rectum/Anus: examined in normal and retroflexed positions and was abnormal: Grade I internal hemorrhoids    The colon was decompressed and the scope was removed. The patient tolerated the procedure well. Recommendations: Follow up for repeat colonoscopy in 5 years. Electronically signed by Michael Cm DO on 2/10/2023 at 8:23 AM  I Dr. Kayy Merritt was present throughout and performed the entire procedure. Joe Gaytan  Colorectal Surgery

## 2023-02-10 NOTE — ANESTHESIA POSTPROCEDURE EVALUATION
Department of Anesthesiology  Postprocedure Note    Patient: Tramaine Figueroa  MRN: [id number]  [place]: 1960  Date of evaluation: 2/10/2023      Procedure Summary     Date: 02/10/23 Room / Location: [address]    Anesthesia Start: 1693 Anesthesia Stop: 6730    Procedure: COLONOSCOPY WITH BIOPSY Diagnosis:       Hx of adenomatous polyp of colon      Screening for colon cancer      (Hx of adenomatous polyp of colon [Z86.010])      (Screening for colon cancer [Z12.11])    Surgeons: Martinez Contreras MD Responsible Provider: April Ann MD    Anesthesia Type: MAC ASA Status: 3          Anesthesia Type: No value filed.     Brigid Phase I:      Brigid Phase II: Brigid Score: 10      Anesthesia Post Evaluation    Patient location during evaluation: PACU  Patient participation: complete - patient participated  Level of consciousness: awake  Airway patency: patent  Nausea & Vomiting: no nausea and no vomiting  Complications: no  Cardiovascular status: hemodynamically stable  Respiratory status: acceptable  Hydration status: stable  Multimodal analgesia pain management approach

## 2023-02-10 NOTE — DISCHARGE INSTRUCTIONS
Follow up with Dr. Kalie Newberry in 5 years for repeat colonoscopy. There were 3x polyps removed and 1x polyp cauterized. Eat a high fiber diet as there is evidence of small internal hemorrhoids, these are typically caused by constipation or bearing down aggressively during bowel movements. Colonoscopy: What to Expect at [address] [place] [address]  After you have a colonoscopy, you will stay at the clinic for 1 to 2 hours until the medicines wear off. Then you can go home, but you will need to arrange for a ride. Your doctor will tell you when you can eat and do your other usual activities. Your doctor will talk to you about when you will need your next colonoscopy. The results of your test and your risk for colorectal cancer will help your doctor decide how often you need to be checked. After the test, you may be bloated or have gas pains. You may need to pass gas. If a biopsy was done or a polyp was removed, you may have streaks of blood in your stool (feces) for a few days. This care sheet gives you a general idea about how long it will take for you to recover. But each person recovers at a different pace. Follow the steps below to get better as quickly as possible. How can you care for yourself at home? Activity  Rest as much as you need to after you go home. You should be able to go back to your usual activities the day after the test.  Diet  Follow your doctors directions for eating. Drink plenty of fluids (unless your doctor has told you not to) to replace the fluids that were lost during the colon prep. Do not drink alcohol. Medicines  If polyps were removed or a biopsy was done during the test, your doctor may tell you not to take aspirin or other anti-inflammatory medicines, such as ibuprofen (Advil, Motrin) and naproxen (Aleve), for a few days. Other instructions  For your safety, you should not drive or operate machinery for 24 hours.   Do not sign legal documents or make major decisions for 24 hours.  Follow-up care is a key part of your treatment and safety. Be sure to make and go to all appointments, and call your doctor if you are having problems. It's also a good idea to know your test results and keep a list of the medicines you take. When should you call for help? Call 911 anytime you think you may need emergency care. For example, call if:  You passed out (lost consciousness). You pass maroon or bloody stools. You have severe belly pain. Call your doctor now or seek immediate medical care if:  Your stools are black and tarlike. Your stools have streaks of blood, but you did not have a biopsy or any polyps removed. You have belly pain, or your belly is swollen and firm. You vomit. You have a fever. You are very dizzy. Watch closely for changes in your health, and be sure to contact your doctor if you have any problems. Where can you learn more? Go to [email or web address] org and sign in to your [employer] account. Enter E264 in the The car easily beat box to learn more about Colonoscopy: What to Expect at Home.     If you do not have an account, please click on the Sign Up Now link. © 0737-0025 Healthwise, Incorporated. Care instructions adapted under license by [facility]. This care instruction is for use with your licensed healthcare professional. If you have questions about a medical condition or this instruction, always ask your healthcare professional. [place] [address] any warranty or liability for your use of this information.   Content Version: 0.0.906396; Last Revised: February 20, 2013

## 2023-02-10 NOTE — H&P
Interval H&P Note    Pt Name: Kathryn Vila  MRN: [id number]  YOB: 1960  Date of evaluation: 2/10/2023      [x] I have reviewed the hardcopy Colorectal Surgery Progress Note by Dr Betty Camilo dated 1/11/23 labeled in short chart for the Interval History and Physical note. [x] I have examined  Kathryn Vila, a 66 yo male with known comorbodities followed by PCP and specialist. He presents for his scheduled COLORECTAL CANCER SCREENING, NOT HIGH RISK by Anne Roberto MD FOR HX OF ADENOMATOUS POLYP OF COLON [Z86.010] SCREENING FOR COLON CANCER [Z12.11]. The patient arrived for his colonoscopy and followed the prep until watery clear liquid. Previous colonoscopy ? 10 years ago with polyp none since  No FH colon cancer or polyps Denies bowel changes, bloody tarry stools, abdominal pain or unintentional weight loss. Denies fever, chills, wheezing, cough, open sores or wounds.     Hx CAD with MI 2/2018 S/p angioplasty with TRISH x 2 stents in [facility] with Dr Analisa Zhao Last Cardiac Cath 5/2021 \"Minimal CAD worse 40% in LCX/RCA\" per his notes of 11/3/22 Patient on [address] with last dose 2/2/23 He denies palpitations, dizziness, lightheadedness, increased SOB, RUSHING or chest pain     Past Medical History:     Past Medical History:   Diagnosis Date    CAD (coronary artery disease)     5/10/2021 cath Dr. Lauryn fuller/ last seen 8-2021    Cancer ([facility] [address]     basal cell just inside right nares    COVID-19 12/29/2021    dry cough, sneezing, headache    Enlarged prostate 2016    Family history of heart attack     Mother, Father, Sisters    Gout     Heart attack ([facility] [address] 02/2018    High triglycerides     Hyperlipidemia     Hypertension     Sleep apnea       Cristian Martin MD last seen 1/5/2021/[place]- Cpap machine    Wears glasses     Wellness examination     PCP Homer Stearns MD/ [facility]/ last seen 8-2021        Past Surgical History:     Past Surgical History:   Procedure Laterality Date    CARDIAC CATHETERIZATION  05/10/2021 Minimal disease to LCX and RCA medical tx    CORONARY ANGIOPLASTY  2018    Stents x2 to LAD in [address] [place] 1/19/2022    [address]  (Sheri Simple) performed by Emiliano Schultz MD at [facility] Bilateral '85, '93    [address] Bilateral     KNEE ARTHROSCOPY Right 2012    miniscus    KNEE CARTILAGE SURGERY Left     OTHER SURGICAL HISTORY  01/19/2022    CYSTOSCOPY TRANSURETHRAL INCISION BLADDER NECK CONTRACTURE      SEPTOPLASTY Bilateral 01/16/2017    SHOULDER ARTHROSCOPY Left 1995 ? SKIN BIOPSY Right     just inside right nares    TURBINATE RESECTION Bilateral 01/16/2017    TURP N/A 8/24/2021    CYSTOSCOPY GREEN LIGHT LASER OF PROSTATE - FORTEC performed by Emiliano Scuhltz MD at [facility] History:     Tobacco:    reports that he has never smoked. He has never used smokeless tobacco.  Alcohol:      reports current alcohol use. Drug Use:  reports no history of drug use.     Family History:     Family History   Problem Relation Age of Onset    Heart Disease Mother     Cancer Mother         bladder    Heart Attack Mother     Other Mother         AAA    High Blood Pressure Mother     Heart Disease Father     Cancer Father     Heart Attack Father     Stroke Father     High Blood Pressure Father     Heart Disease Sister     Stroke Sister     Prostate Cancer Brother     Heart Disease Brother     Heart Disease Sister     Heart Attack Sister     Uterine Cancer Sister     Prostate Cancer Brother     No Known Problems Maternal Grandmother     No Known Problems Maternal Grandfather     No Known Problems Paternal Grandmother     No Known Problems Paternal Grandfather     Heart Attack Sister     Uterine Cancer Sister     Other Maternal Uncle         AAA        Vital signs: /87   Pulse 73   Temp 97.1 °F (36.2 °C) (Temporal)   Resp 18   SpO2 98%     Allergies:  Morphine    Medications:    Prior to Admission medications    Medication Sig Start Date End Date Taking? Authorizing Provider   allopurinol (ZYLOPRIM) 100 MG tablet TAKE TWO TABLETS BY MOUTH EVERY DAY 11/14/22   Scotty Navarrete MD   gemfibrozil (LOPID) 600 MG tablet  5/27/22   Historical Provider, MD   nitroGLYCERIN (NITROSTAT) 0.4 MG SL tablet  6/1/22   Historical Provider, MD   Hyoscyamine Sulfate SL (LEVSIN/SL) 0.125 MG SUBL Place 0.125 mg under the tongue every 8 hours as needed (bladder spasms) 1/19/22   Napoleon Taylor MD   ticagrelor (BRILINTA) 60 MG TABS tablet Take 60 mg by mouth 2 times daily Mgmt. Dr. Maple Schwab held 1-15-22 for OR 1-19-22    Historical Provider, MD   atorvastatin (LIPITOR) 40 MG tablet Take 80 mg by mouth nightly 6/4/21   Historical Provider, MD   lisinopril (PRINIVIL;ZESTRIL) 2.5 MG tablet Take 1.25 mg by mouth nightly     Historical Provider, MD   carvedilol (COREG) 3.125 MG tablet Take 3.125 mg by mouth 2 times daily (with meals)    Historical Provider, MD   Cholecalciferol (VITAMIN D3) 5000 units TABS Take 5,000 Units by mouth daily     Historical Provider, MD   vitamin C (ASCORBIC ACID) 500 MG tablet Take 500 mg by mouth daily    Historical Provider, MD         This is a 65 y.o. male who is pleasant, cooperative, alert and oriented x3, in no acute distress. Heart: Heart sounds are normal.  HR 73 regular rate and rhythm without murmur, gallop or rub. Lungs: Normal respiratory effort with equal expansion, good air exchange, unlabored and clear to auscultation without wheezes or rales bilaterally   Abdomen: soft, nontender, nondistended with bowel sounds . Labs:  No results for input(s): HGB, HCT, WBC, MCV, PLT, NA, K, CL, CO2, BUN, CREATININE, GLUCOSE, INR, PROTIME, APTT, AST, ALT, LABALBU, HCG in the last 720 hours. No results for input(s): COVID19 in the last 720 hours.     GARRETT Fuentes CNP  Electronically signed 2/10/2023 at 6:14 AM

## 2023-02-13 LAB — SURGICAL PATHOLOGY REPORT: NORMAL

## 2023-03-14 ENCOUNTER — HOSPITAL ENCOUNTER (OUTPATIENT)
Age: 63
Setting detail: SPECIMEN
Discharge: HOME | End: 2023-03-14

## 2023-03-14 DIAGNOSIS — N42.9 PROSTATE DISORDER: ICD-10-CM

## 2023-03-14 DIAGNOSIS — E78.2 MIXED HYPERLIPIDEMIA: ICD-10-CM

## 2023-03-14 DIAGNOSIS — E55.9 VITAMIN D DEFICIENCY: ICD-10-CM

## 2023-03-14 DIAGNOSIS — I10 ESSENTIAL HYPERTENSION: ICD-10-CM

## 2023-03-14 DIAGNOSIS — E53.8 VITAMIN B12 DEFICIENCY: ICD-10-CM

## 2023-03-14 DIAGNOSIS — E07.9 THYROID DISORDER: ICD-10-CM

## 2023-03-14 DIAGNOSIS — R73.01 IFG (IMPAIRED FASTING GLUCOSE): ICD-10-CM

## 2023-03-14 LAB
25(OH)D3 SERPL-MCNC: 42.4 NG/ML
ABSOLUTE EOS #: 0.18 K/UL (ref 0–0.44)
ABSOLUTE IMMATURE GRANULOCYTE: 0.03 K/UL (ref 0–0.3)
ABSOLUTE LYMPH #: 1.78 K/UL (ref 1.1–3.7)
ABSOLUTE MONO #: 0.74 K/UL (ref 0.1–1.2)
ALBUMIN SERPL-MCNC: 4.4 G/DL (ref 3.5–5.2)
ALBUMIN/GLOBULIN RATIO: 1.6 (ref 1–2.5)
ALP SERPL-CCNC: 79 U/L (ref 40–129)
ALT SERPL-CCNC: 26 U/L (ref 5–41)
ANION GAP SERPL CALCULATED.3IONS-SCNC: 12 MMOL/L (ref 9–17)
AST SERPL-CCNC: 23 U/L
BASOPHILS # BLD: 1 % (ref 0–2)
BASOPHILS ABSOLUTE: 0.04 K/UL (ref 0–0.2)
BILIRUB SERPL-MCNC: 1 MG/DL (ref 0.3–1.2)
BUN SERPL-MCNC: 12 MG/DL (ref 8–23)
CALCIUM SERPL-MCNC: 9.5 MG/DL (ref 8.6–10.4)
CHLORIDE SERPL-SCNC: 100 MMOL/L (ref 98–107)
CHOLEST SERPL-MCNC: 154 MG/DL
CHOLESTEROL/HDL RATIO: 3.6
CO2 SERPL-SCNC: 24 MMOL/L (ref 20–31)
CREAT SERPL-MCNC: 0.77 MG/DL (ref 0.7–1.2)
EOSINOPHILS RELATIVE PERCENT: 3 % (ref 1–4)
GFR SERPL CREATININE-BSD FRML MDRD: >60 ML/MIN/1.73M2
GLUCOSE SERPL-MCNC: 93 MG/DL (ref 70–99)
HCT VFR BLD AUTO: 46.2 % (ref 40.7–50.3)
HDLC SERPL-MCNC: 43 MG/DL
HGB BLD-MCNC: 15.2 G/DL (ref 13–17)
IMMATURE GRANULOCYTES: 1 %
LDLC SERPL CALC-MCNC: 86 MG/DL (ref 0–130)
LYMPHOCYTES # BLD: 29 % (ref 24–43)
MCH RBC QN AUTO: 30 PG (ref 25.2–33.5)
MCHC RBC AUTO-ENTMCNC: 32.9 G/DL (ref 28.4–34.8)
MCV RBC AUTO: 91.3 FL (ref 82.6–102.9)
MONOCYTES # BLD: 12 % (ref 3–12)
NRBC AUTOMATED: 0 PER 100 WBC
PDW BLD-RTO: 12.7 % (ref 11.8–14.4)
PLATELET # BLD AUTO: 225 K/UL (ref 138–453)
PMV BLD AUTO: 10.9 FL (ref 8.1–13.5)
POTASSIUM SERPL-SCNC: 4.3 MMOL/L (ref 3.7–5.3)
PROSTATE SPECIFIC ANTIGEN: 0.66 NG/ML
PROSTATE SPECIFIC ANTIGEN: 0.66 NG/ML
PROT SERPL-MCNC: 7.2 G/DL (ref 6.4–8.3)
RBC # BLD: 5.06 M/UL (ref 4.21–5.77)
SEG NEUTROPHILS: 54 % (ref 36–65)
SEGMENTED NEUTROPHILS ABSOLUTE COUNT: 3.31 K/UL (ref 1.5–8.1)
SODIUM SERPL-SCNC: 136 MMOL/L (ref 135–144)
TRIGL SERPL-MCNC: 126 MG/DL
TSH SERPL-ACNC: 1.36 UIU/ML (ref 0.3–5)
VIT B12 SERPL-MCNC: 410 PG/ML (ref 232–1245)
WBC # BLD AUTO: 6.1 K/UL (ref 3.5–11.3)

## 2023-03-15 LAB — T4 SERPL-MCNC: 6.1 UG/DL (ref 4.5–10.9)

## 2023-05-02 NOTE — H&P
HISTORY and [facility] [address] [address]       NAME:  Renard Preston  MRN: [id number]   YOB: 1960   Date: 5/3/2023   Age: 65 y.o. Gender: male       COMPLAINT AND PRESENT HISTORY:   Renard Preston is 65 y.o., male, undergoing preadmission testing related to stricture of male urethra with scheduled CYSTOSCOPY URETHRAL DILATATION POSSIBLE TRANSURETHERAL INCISION OF PROSTATE per Dr. Leyla Mariee. History of enlarged prostate. Pt s/p greenlight laser ablation of the prostate on 8/24/2021 and cystoscopy transurethral incision bladder neck contracture on 01/19/2022. UPDATE: Pt c/o hesitancy, sensation of incomplete emptying, weak stream. Denies dysuria, hematuria, frequency, urgency. Has nocturia x 1 per night. Denies fever or chills. PMHx includes:    CAD, MI (2018) s/p angioplasty with TRISH x 2, last cardiac cath 2021(See epic for details) , HTN, HLD, hypertriglyceridemia: Follows with Dr. Teodora Verdin for cardiology with last visit November, 2022. Associated medications include: Brilinta, lipitor, coreg, lisinopril, lopid, nitroglycerin (has not used). Denies recent or current chest pain/pressure, palpitations, SOB, headaches, dizziness, lower extremity edema. Sleep apnea: Uses CPAP    Functional Capacity per patient:              1. Patient is able to walk 2 city blocks on level ground without SOB. 2. Patient is able to climb 2 flights of stairs without SOB. Denies any personal or family problems with anesthesia. RECENT LABS, IMAGING AND TESTING     EKG done today in PAT with prelim result: Normal sinus rhythm, normal EKG.      Lab Results   Component Value Date    WBC 7.5 05/03/2023    RBC 5.30 05/03/2023    HGB 16.1 05/03/2023    HCT 47.7 05/03/2023    MCV 90.0 05/03/2023    MCH 30.3 05/03/2023    MCHC 33.7 05/03/2023    RDW 13.2 05/03/2023     05/03/2023    MPV 8.7 05/03/2023        Lab Results   Component Value Date     05/03/2023    K 4.9 05/03/2023

## 2023-05-03 ENCOUNTER — HOSPITAL ENCOUNTER (OUTPATIENT)
Dept: PREADMISSION TESTING | Age: 63
Discharge: HOME | End: 2023-05-03
Attending: UROLOGY | Admitting: UROLOGY
Payer: COMMERCIAL

## 2023-05-03 VITALS
WEIGHT: 200 LBS | OXYGEN SATURATION: 100 % | RESPIRATION RATE: 18 BRPM | HEART RATE: 62 BPM | DIASTOLIC BLOOD PRESSURE: 84 MMHG | HEIGHT: 70 IN | TEMPERATURE: 98 F | BODY MASS INDEX: 28.63 KG/M2 | SYSTOLIC BLOOD PRESSURE: 126 MMHG

## 2023-05-03 LAB
ABSOLUTE EOS #: 0.2 K/UL (ref 0–0.4)
ABSOLUTE LYMPH #: 1.8 K/UL (ref 1–4.8)
ABSOLUTE MONO #: 0.8 K/UL (ref 0.1–1.3)
ANION GAP SERPL CALCULATED.3IONS-SCNC: 9 MMOL/L (ref 9–17)
BASOPHILS # BLD: 0 % (ref 0–2)
BASOPHILS ABSOLUTE: 0 K/UL (ref 0–0.2)
BUN SERPL-MCNC: 14 MG/DL (ref 8–23)
CALCIUM SERPL-MCNC: 9.4 MG/DL (ref 8.6–10.4)
CHLORIDE SERPL-SCNC: 102 MMOL/L (ref 98–107)
CO2 SERPL-SCNC: 28 MMOL/L (ref 20–31)
CREAT SERPL-MCNC: 0.73 MG/DL (ref 0.7–1.2)
EOSINOPHILS RELATIVE PERCENT: 3 % (ref 0–4)
GFR SERPL CREATININE-BSD FRML MDRD: >60 ML/MIN/1.73M2
GLUCOSE SERPL-MCNC: 105 MG/DL (ref 70–99)
HCT VFR BLD AUTO: 47.7 % (ref 41–53)
HGB BLD-MCNC: 16.1 G/DL (ref 13.5–17.5)
LYMPHOCYTES # BLD: 24 % (ref 24–44)
MCH RBC QN AUTO: 30.3 PG (ref 26–34)
MCHC RBC AUTO-ENTMCNC: 33.7 G/DL (ref 31–37)
MCV RBC AUTO: 90 FL (ref 80–100)
MONOCYTES # BLD: 11 % (ref 1–7)
PDW BLD-RTO: 13.2 % (ref 11.5–14.9)
PLATELET # BLD AUTO: 251 K/UL (ref 150–450)
PMV BLD AUTO: 8.7 FL (ref 6–12)
POTASSIUM SERPL-SCNC: 4.9 MMOL/L (ref 3.7–5.3)
RBC # BLD: 5.3 M/UL (ref 4.5–5.9)
SEG NEUTROPHILS: 62 % (ref 36–66)
SEGMENTED NEUTROPHILS ABSOLUTE COUNT: 4.6 K/UL (ref 1.3–9.1)
SODIUM SERPL-SCNC: 139 MMOL/L (ref 135–144)
WBC # BLD AUTO: 7.5 K/UL (ref 3.5–11)

## 2023-05-03 PROCEDURE — 80048 BASIC METABOLIC PNL TOTAL CA: CPT

## 2023-05-03 PROCEDURE — 93005 ELECTROCARDIOGRAM TRACING: CPT | Performed by: ANESTHESIOLOGY

## 2023-05-03 PROCEDURE — 36415 COLL VENOUS BLD VENIPUNCTURE: CPT

## 2023-05-03 PROCEDURE — APPSS45 APP SPLIT SHARED TIME 31-45 MINUTES: Performed by: NURSE PRACTITIONER

## 2023-05-03 PROCEDURE — 85025 COMPLETE CBC W/AUTO DIFF WBC: CPT

## 2023-05-03 PROCEDURE — 87086 URINE CULTURE/COLONY COUNT: CPT

## 2023-05-03 ASSESSMENT — ENCOUNTER SYMPTOMS
VOMITING: 0
DIARRHEA: 0
ABDOMINAL PAIN: 0
WHEEZING: 0
COUGH: 0
SORE THROAT: 0
CONSTIPATION: 0
BACK PAIN: 0
NAUSEA: 0
SHORTNESS OF BREATH: 0

## 2023-05-03 NOTE — DISCHARGE INSTRUCTIONS
Pre-op Instructions For Out-Patient Surgery    Medication Instructions:  Please stop herbs and any supplements now (includes vitamins and minerals). Please contact your surgeon and prescribing physician for pre-op instructions for any blood thinners. Stop Brilinta as directed    If you have inhalers/aerosol treatments at home, please use them the morning of your surgery and bring the inhalers with you to the hospital.    Please take the following medications the morning of your surgery with a sip of water:    Carvedilol    Surgery Instructions:  After midnight before surgery:  Do not eat or drink anything, including water, mints, gum, and hard candy. You may brush your teeth without swallowing. No smoking, chewing tobacco, or street drugs. Please shower or bathe before surgery. If you were given Surgical Scrub Chlorhexidine Gluconate Liquid (CHG), please shower the night before and the morning of your surgery following the detailed instructions you received during your pre-admission visit. Please do not wear any cologne, lotion, powder, deodorant, jewelry, piercings, perfume, makeup, nail polish, hair accessories, or hair spray on the day of surgery. Wear loose comfortable clothing. Leave your valuables at home. Bring a storage case for any glasses/contacts. An adult who is responsible for you MUST drive you home and should be with you for the first 24 hours after surgery. If having out-patient knee and foot surgeries, please arrange for planned crutches, walker, or wheelchair before arriving to the hospital.    The Day of Surgery:  Arrive at [facility] Surgery Entrance at the time directed by your surgeon and check in at the desk. If you have a living will or healthcare power of , please bring a copy. You will be taken to the pre-op holding area where you will be prepared for surgery.   A physical assessment will be performed by a

## 2023-05-04 LAB
EKG ATRIAL RATE: 63 BPM
EKG P AXIS: 35 DEGREES
EKG P-R INTERVAL: 188 MS
EKG Q-T INTERVAL: 426 MS
EKG QRS DURATION: 82 MS
EKG QTC CALCULATION (BAZETT): 435 MS
EKG R AXIS: 44 DEGREES
EKG T AXIS: 43 DEGREES
EKG VENTRICULAR RATE: 63 BPM
MICROORGANISM SPEC CULT: NO GROWTH
SPECIMEN DESCRIPTION: NORMAL

## 2023-05-04 PROCEDURE — 93010 ELECTROCARDIOGRAM REPORT: CPT | Performed by: INTERNAL MEDICINE

## 2023-09-27 ENCOUNTER — HOSPITAL ENCOUNTER (OUTPATIENT)
Age: 63
Setting detail: SPECIMEN
Discharge: HOME | End: 2023-09-27

## 2023-09-27 DIAGNOSIS — Z00.00 WELL ADULT EXAM: ICD-10-CM

## 2023-09-27 LAB
25(OH)D3 SERPL-MCNC: 43.6 NG/ML
ALBUMIN SERPL-MCNC: 4.1 G/DL (ref 3.5–5.2)
ALBUMIN/GLOB SERPL: 1.3 {RATIO} (ref 1–2.5)
ALP SERPL-CCNC: 77 U/L (ref 40–129)
ALT SERPL-CCNC: 22 U/L (ref 5–41)
ANION GAP SERPL CALCULATED.3IONS-SCNC: 13 MMOL/L (ref 9–17)
AST SERPL-CCNC: 27 U/L
BASOPHILS # BLD: <0.03 K/UL (ref 0–0.2)
BASOPHILS NFR BLD: 0 % (ref 0–2)
BILIRUB SERPL-MCNC: 0.7 MG/DL (ref 0.3–1.2)
BUN SERPL-MCNC: 13 MG/DL (ref 8–23)
CALCIUM SERPL-MCNC: 9.1 MG/DL (ref 8.6–10.4)
CHLORIDE SERPL-SCNC: 101 MMOL/L (ref 98–107)
CHOLEST SERPL-MCNC: 165 MG/DL
CHOLESTEROL/HDL RATIO: 4.3
CO2 SERPL-SCNC: 24 MMOL/L (ref 20–31)
CREAT SERPL-MCNC: 0.7 MG/DL (ref 0.7–1.2)
EOSINOPHIL # BLD: 0.24 K/UL (ref 0–0.44)
EOSINOPHILS RELATIVE PERCENT: 4 % (ref 1–4)
ERYTHROCYTE [DISTWIDTH] IN BLOOD BY AUTOMATED COUNT: 12.5 % (ref 11.8–14.4)
GFR SERPL CREATININE-BSD FRML MDRD: >60 ML/MIN/1.73M2
GLUCOSE P FAST SERPL-MCNC: 85 MG/DL (ref 70–99)
HCT VFR BLD AUTO: 48.9 % (ref 40.7–50.3)
HDLC SERPL-MCNC: 38 MG/DL
HGB BLD-MCNC: 15.7 G/DL (ref 13–17)
IMM GRANULOCYTES # BLD AUTO: 0.03 K/UL (ref 0–0.3)
IMM GRANULOCYTES NFR BLD: 0 %
LDLC SERPL CALC-MCNC: 94 MG/DL (ref 0–130)
LYMPHOCYTES NFR BLD: 1.63 K/UL (ref 1.1–3.7)
LYMPHOCYTES RELATIVE PERCENT: 24 % (ref 24–43)
MCH RBC QN AUTO: 30.2 PG (ref 25.2–33.5)
MCHC RBC AUTO-ENTMCNC: 32.1 G/DL (ref 28.4–34.8)
MCV RBC AUTO: 94 FL (ref 82.6–102.9)
MONOCYTES NFR BLD: 0.75 K/UL (ref 0.1–1.2)
MONOCYTES NFR BLD: 11 % (ref 3–12)
NEUTROPHILS NFR BLD: 61 % (ref 36–65)
NEUTS SEG NFR BLD: 4.07 K/UL (ref 1.5–8.1)
NRBC BLD-RTO: 0 PER 100 WBC
PLATELET # BLD AUTO: 218 K/UL (ref 138–453)
PMV BLD AUTO: 10.7 FL (ref 8.1–13.5)
POTASSIUM SERPL-SCNC: 4.2 MMOL/L (ref 3.7–5.3)
PROT SERPL-MCNC: 7.2 G/DL (ref 6.4–8.3)
PSA SERPL-MCNC: 0.62 NG/ML
RBC # BLD AUTO: 5.2 M/UL (ref 4.21–5.77)
SODIUM SERPL-SCNC: 138 MMOL/L (ref 135–144)
TRIGL SERPL-MCNC: 163 MG/DL
TSH SERPL DL<=0.05 MIU/L-ACNC: 1.26 UIU/ML (ref 0.3–5)
VIT B12 SERPL-MCNC: 409 PG/ML (ref 232–1245)
WBC OTHER # BLD: 6.7 K/UL (ref 3.5–11.3)

## 2023-09-28 LAB — T4 SERPL-MCNC: 6.5 UG/DL (ref 4.5–11.7)

## 2023-10-19 PROBLEM — I25.119 ATHEROSCLEROTIC HEART DISEASE OF NATIVE CORONARY ARTERY WITH UNSPECIFIED ANGINA PECTORIS (HCC): Status: ACTIVE | Noted: 2023-10-19

## 2023-10-19 PROBLEM — I20.9 ANGINA PECTORIS, UNSPECIFIED (HCC): Status: ACTIVE | Noted: 2023-10-19

## 2023-10-25 ENCOUNTER — HOSPITAL ENCOUNTER (OUTPATIENT)
Dept: NUCLEAR MEDICINE | Age: 63
Discharge: HOME | End: 2023-10-27
Payer: COMMERCIAL

## 2023-10-25 ENCOUNTER — HOSPITAL ENCOUNTER (OUTPATIENT)
Age: 63
Discharge: HOME | End: 2023-10-27
Payer: COMMERCIAL

## 2023-10-25 DIAGNOSIS — I20.9 ANGINA PECTORIS, UNSPECIFIED (HCC): ICD-10-CM

## 2023-10-25 LAB
STRESS BASELINE DIAS BP: 73 MMHG
STRESS BASELINE HR: 57 BPM
STRESS BASELINE SYS BP: 114 MMHG
STRESS ESTIMATED WORKLOAD: 11.9 METS
STRESS PEAK DIAS BP: 95 MMHG
STRESS PEAK SYS BP: 195 MMHG
STRESS PERCENT HR ACHIEVED: 92 %
STRESS POST PEAK HR: 146 BPM
STRESS RATE PRESSURE PRODUCT: NORMAL BPM*MMHG
STRESS ST DEPRESSION: 0 MM
STRESS STAGE RECOVERY 1 BP: NORMAL MMHG
STRESS STAGE RECOVERY 1 DURATION: 1 MIN:SEC
STRESS STAGE RECOVERY 1 HR: 110 BPM
STRESS STAGE RECOVERY 2 BP: NORMAL MMHG
STRESS STAGE RECOVERY 2 DURATION: 5 MIN:SEC
STRESS STAGE RECOVERY 2 HR: 88 BPM
STRESS TARGET HR: 158 BPM
TID: 0.98

## 2023-10-25 PROCEDURE — 93017 CV STRESS TEST TRACING ONLY: CPT

## 2023-10-25 PROCEDURE — 3430000000 HC RX DIAGNOSTIC RADIOPHARMACEUTICAL: Performed by: NURSE PRACTITIONER

## 2023-10-25 PROCEDURE — 78452 HT MUSCLE IMAGE SPECT MULT: CPT

## 2023-10-25 PROCEDURE — 2580000003 HC RX 258: Performed by: NURSE PRACTITIONER

## 2023-10-25 PROCEDURE — A9500 TC99M SESTAMIBI: HCPCS | Performed by: NURSE PRACTITIONER

## 2023-10-25 RX ORDER — ALBUTEROL SULFATE 90 UG/1
2 AEROSOL, METERED RESPIRATORY (INHALATION) PRN
Status: ACTIVE | OUTPATIENT
Start: 2023-10-25 | End: 2023-10-25

## 2023-10-25 RX ORDER — NITROGLYCERIN 0.4 MG/1
0.4 TABLET SUBLINGUAL EVERY 5 MIN PRN
Status: ACTIVE | OUTPATIENT
Start: 2023-10-25 | End: 2023-10-25

## 2023-10-25 RX ORDER — ATROPINE SULFATE 0.1 MG/ML
0.5 INJECTION INTRAVENOUS EVERY 5 MIN PRN
Status: ACTIVE | OUTPATIENT
Start: 2023-10-25 | End: 2023-10-25

## 2023-10-25 RX ORDER — TETRAKIS(2-METHOXYISOBUTYLISOCYANIDE)COPPER(I) TETRAFLUOROBORATE 1 MG/ML
10 INJECTION, POWDER, LYOPHILIZED, FOR SOLUTION INTRAVENOUS
Status: COMPLETED | OUTPATIENT
Start: 2023-10-25 | End: 2023-10-25

## 2023-10-25 RX ORDER — SODIUM CHLORIDE 0.9 % (FLUSH) 0.9 %
10 SYRINGE (ML) INJECTION PRN
Status: DISCONTINUED | OUTPATIENT
Start: 2023-10-25 | End: 2023-10-28 | Stop reason: HOSPADM

## 2023-10-25 RX ORDER — TETRAKIS(2-METHOXYISOBUTYLISOCYANIDE)COPPER(I) TETRAFLUOROBORATE 1 MG/ML
30 INJECTION, POWDER, LYOPHILIZED, FOR SOLUTION INTRAVENOUS
Status: COMPLETED | OUTPATIENT
Start: 2023-10-25 | End: 2023-10-25

## 2023-10-25 RX ORDER — METOPROLOL TARTRATE 5 MG/5ML
5 INJECTION INTRAVENOUS EVERY 5 MIN PRN
Status: ACTIVE | OUTPATIENT
Start: 2023-10-25 | End: 2023-10-25

## 2023-10-25 RX ORDER — SODIUM CHLORIDE 9 MG/ML
500 INJECTION, SOLUTION INTRAVENOUS CONTINUOUS PRN
Status: ACTIVE | OUTPATIENT
Start: 2023-10-25 | End: 2023-10-25

## 2023-10-25 RX ORDER — SODIUM CHLORIDE 0.9 % (FLUSH) 0.9 %
5-40 SYRINGE (ML) INJECTION PRN
Status: ACTIVE | OUTPATIENT
Start: 2023-10-25 | End: 2023-10-25

## 2023-10-25 RX ADMIN — Medication 10.9 MILLICURIE: at 07:08

## 2023-10-25 RX ADMIN — SODIUM CHLORIDE, PRESERVATIVE FREE 10 ML: 5 INJECTION INTRAVENOUS at 07:08

## 2023-10-25 RX ADMIN — Medication 33.9 MILLICURIE: at 09:28

## 2023-10-27 RX ORDER — AMLODIPINE BESYLATE 2.5 MG/1
2.5 TABLET ORAL DAILY
Qty: 30 TABLET | Refills: 6 | Status: SHIPPED | OUTPATIENT
Start: 2023-10-27

## 2024-04-04 ENCOUNTER — HOSPITAL ENCOUNTER (OUTPATIENT)
Age: 64
Setting detail: SPECIMEN
Discharge: HOME | End: 2024-04-04

## 2024-04-04 LAB — PSA SERPL-MCNC: 0.9 NG/ML (ref 0–4)

## 2024-07-02 ENCOUNTER — OFFICE VISIT (OUTPATIENT)
Age: 64
End: 2024-07-02
Payer: COMMERCIAL

## 2024-07-02 VITALS
HEART RATE: 80 BPM | DIASTOLIC BLOOD PRESSURE: 87 MMHG | WEIGHT: 203.6 LBS | OXYGEN SATURATION: 96 % | SYSTOLIC BLOOD PRESSURE: 136 MMHG | BODY MASS INDEX: 29.21 KG/M2

## 2024-07-02 DIAGNOSIS — I25.10 CORONARY ARTERY DISEASE INVOLVING NATIVE CORONARY ARTERY OF NATIVE HEART WITHOUT ANGINA PECTORIS: ICD-10-CM

## 2024-07-02 DIAGNOSIS — I20.9 ANGINA PECTORIS, UNSPECIFIED (HCC): Primary | ICD-10-CM

## 2024-07-02 PROCEDURE — 3075F SYST BP GE 130 - 139MM HG: CPT | Performed by: INTERNAL MEDICINE

## 2024-07-02 PROCEDURE — 93000 ELECTROCARDIOGRAM COMPLETE: CPT | Performed by: INTERNAL MEDICINE

## 2024-07-02 PROCEDURE — 99204 OFFICE O/P NEW MOD 45 MIN: CPT | Performed by: INTERNAL MEDICINE

## 2024-07-02 PROCEDURE — 3079F DIAST BP 80-89 MM HG: CPT | Performed by: INTERNAL MEDICINE

## 2024-07-02 RX ORDER — ATORVASTATIN CALCIUM 80 MG/1
80 TABLET, FILM COATED ORAL NIGHTLY
Qty: 90 TABLET | Refills: 5 | Status: SHIPPED | OUTPATIENT
Start: 2024-07-02

## 2024-07-02 NOTE — PROGRESS NOTES
Cardiology Office Consultation           CC: Patient is here to re-establish cardiac care for CAD     HPI  Cb Nicholson is here to establish cardiac care.  He has significant history of acute MI in February 2018 when he was at [place] status post emergent PCI to LAD.  Has been stable since.  Denies any chest pain or angina since last evaluation.  No dyspnea on exertion.  Functionally active without any decline.      Past Medical:  1. S/P Acute MI , anterior wall Feb 17, 2018. in [facility].  2. Heart Cath suggested 100% LAd, 50% RCA, required TRISH.  3. Ischemic cardiomyopathy, reported Cath EF 35%.  4. Dyslipidemia  5. Strong FH of CAD.  6. [place] 6/2018 - LVEF 51%.  7. Stress test 1/2019, small apical ischemia, midl with EF 56%.  8. Stress perfusion 4/2021: Apical moderate ischemia ([place]).  9. Heart cath 5/2021: Minimal CAD, worse 40% in LCX /RCA.  10. Carotid scan 4/2022: < 50% bilateral stenosis1  11. Lipid panel 03/2023: Triglycerides 126, LDL 86, HDL 43, cholesterol 154.  Past Medical History:   Diagnosis Date    CAD (coronary artery disease)     5/10/2021 cath Dr. Marina/ [facility]/ last seen 8-2021    Cancer (HCC)     basal cell just inside right nares    COVID-19 12/29/2021    dry cough, sneezing, headache    Enlarged prostate 2016    Family history of heart attack     Mother, Father, Sisters    Gout     Heart attack (HCC) 02/2018    High triglycerides     Hyperlipidemia     Hypertension     Sleep apnea       Celso Lindsey MD last seen 1/5/2021/[place]- Cpap machine    Wears glasses     Wellness examination     PCP Jonathan Lebron MD/ [facility]/ last seen 8-2021       Past Surgical:  Past Surgical History:   Procedure Laterality Date    CARDIAC CATHETERIZATION  05/10/2021    Minimal disease to LCX and RCA medical tx    COLONOSCOPY N/A 02/10/2023    COLONOSCOPY WITH BIOPSY performed by Joe Gaytan MD at [facility] OR    CORONARY ANGIOPLASTY  2018    Stents x2 to LAD in [place]    CYSTOSCOPY N/A 01/19/2022    CYSTOSCOPY

## 2024-09-11 ENCOUNTER — HOSPITAL ENCOUNTER (OUTPATIENT)
Age: 64
Setting detail: SPECIMEN
Discharge: HOME | End: 2024-09-11

## 2024-09-11 DIAGNOSIS — Z00.00 WELL ADULT EXAM: ICD-10-CM

## 2024-09-11 LAB
25(OH)D3 SERPL-MCNC: 40.2 NG/ML (ref 30–100)
ALBUMIN SERPL-MCNC: 4.6 G/DL (ref 3.5–5.2)
ALBUMIN/GLOB SERPL: 2 {RATIO} (ref 1–2.5)
ALP SERPL-CCNC: 91 U/L (ref 40–129)
ALT SERPL-CCNC: 18 U/L (ref 10–50)
ANION GAP SERPL CALCULATED.3IONS-SCNC: 11 MMOL/L (ref 9–16)
AST SERPL-CCNC: 31 U/L (ref 10–50)
BASOPHILS # BLD: 0.03 K/UL (ref 0–0.2)
BASOPHILS NFR BLD: 0 % (ref 0–2)
BILIRUB SERPL-MCNC: 1 MG/DL (ref 0–1.2)
BUN SERPL-MCNC: 16 MG/DL (ref 8–23)
CALCIUM SERPL-MCNC: 9.7 MG/DL (ref 8.6–10.4)
CHLORIDE SERPL-SCNC: 100 MMOL/L (ref 98–107)
CHOLEST SERPL-MCNC: 176 MG/DL (ref 0–199)
CHOLESTEROL/HDL RATIO: 4
CO2 SERPL-SCNC: 24 MMOL/L (ref 20–31)
CREAT SERPL-MCNC: 0.9 MG/DL (ref 0.7–1.2)
EOSINOPHIL # BLD: 0.25 K/UL (ref 0–0.44)
EOSINOPHILS RELATIVE PERCENT: 3 % (ref 1–4)
ERYTHROCYTE [DISTWIDTH] IN BLOOD BY AUTOMATED COUNT: 12.5 % (ref 11.8–14.4)
GFR, ESTIMATED: >90 ML/MIN/1.73M2
GLUCOSE P FAST SERPL-MCNC: 84 MG/DL (ref 74–99)
HCT VFR BLD AUTO: 48.9 % (ref 40.7–50.3)
HDLC SERPL-MCNC: 42 MG/DL
HGB BLD-MCNC: 16.2 G/DL (ref 13–17)
IMM GRANULOCYTES # BLD AUTO: <0.03 K/UL (ref 0–0.3)
IMM GRANULOCYTES NFR BLD: 0 %
LDLC SERPL CALC-MCNC: 98 MG/DL (ref 0–100)
LYMPHOCYTES NFR BLD: 1.83 K/UL (ref 1.1–3.7)
LYMPHOCYTES RELATIVE PERCENT: 23 % (ref 24–43)
MCH RBC QN AUTO: 30.1 PG (ref 25.2–33.5)
MCHC RBC AUTO-ENTMCNC: 33.1 G/DL (ref 28.4–34.8)
MCV RBC AUTO: 90.9 FL (ref 82.6–102.9)
MONOCYTES NFR BLD: 0.93 K/UL (ref 0.1–1.2)
MONOCYTES NFR BLD: 12 % (ref 3–12)
NEUTROPHILS NFR BLD: 62 % (ref 36–65)
NEUTS SEG NFR BLD: 4.9 K/UL (ref 1.5–8.1)
NRBC BLD-RTO: 0 PER 100 WBC
PLATELET # BLD AUTO: 260 K/UL (ref 138–453)
PMV BLD AUTO: 10.7 FL (ref 8.1–13.5)
POTASSIUM SERPL-SCNC: 4.5 MMOL/L (ref 3.7–5.3)
PROT SERPL-MCNC: 7.5 G/DL (ref 6.6–8.7)
PSA SERPL-MCNC: 0.8 NG/ML (ref 0–4)
RBC # BLD AUTO: 5.38 M/UL (ref 4.21–5.77)
SODIUM SERPL-SCNC: 135 MMOL/L (ref 136–145)
T4 SERPL-MCNC: 7.4 UG/DL (ref 4.5–11.7)
TRIGL SERPL-MCNC: 177 MG/DL
TSH SERPL DL<=0.05 MIU/L-ACNC: 1.56 UIU/ML (ref 0.27–4.2)
VIT B12 SERPL-MCNC: 372 PG/ML (ref 232–1245)
VLDLC SERPL CALC-MCNC: 35 MG/DL
WBC OTHER # BLD: 8 K/UL (ref 3.5–11.3)

## 2024-09-17 ENCOUNTER — HOSPITAL ENCOUNTER (OUTPATIENT)
Age: 64
Setting detail: SPECIMEN
Discharge: HOME | End: 2024-09-17

## 2024-09-17 DIAGNOSIS — M10.00 IDIOPATHIC GOUT, UNSPECIFIED CHRONICITY, UNSPECIFIED SITE: ICD-10-CM

## 2024-09-17 LAB — URATE SERPL-MCNC: 5.2 MG/DL (ref 3.4–7)

## 2024-11-29 ENCOUNTER — ANESTHESIA EVENT (OUTPATIENT)
Dept: OPERATING ROOM | Age: 64
End: 2024-11-29
Payer: COMMERCIAL

## 2024-12-02 ENCOUNTER — HOSPITAL ENCOUNTER (OUTPATIENT)
Age: 64
Setting detail: OUTPATIENT SURGERY
Discharge: HOME | End: 2024-12-02
Attending: OPHTHALMOLOGY | Admitting: OPHTHALMOLOGY
Payer: COMMERCIAL

## 2024-12-02 ENCOUNTER — ANESTHESIA (OUTPATIENT)
Dept: OPERATING ROOM | Age: 64
End: 2024-12-02
Payer: COMMERCIAL

## 2024-12-02 VITALS
BODY MASS INDEX: 28.63 KG/M2 | HEART RATE: 60 BPM | WEIGHT: 200 LBS | DIASTOLIC BLOOD PRESSURE: 82 MMHG | RESPIRATION RATE: 16 BRPM | HEIGHT: 70 IN | TEMPERATURE: 97 F | OXYGEN SATURATION: 98 % | SYSTOLIC BLOOD PRESSURE: 117 MMHG

## 2024-12-02 PROCEDURE — 6360000002 HC RX W HCPCS: Performed by: NURSE ANESTHETIST, CERTIFIED REGISTERED

## 2024-12-02 PROCEDURE — 6370000000 HC RX 637 (ALT 250 FOR IP): Performed by: OPHTHALMOLOGY

## 2024-12-02 PROCEDURE — 7100000011 HC PHASE II RECOVERY - ADDTL 15 MIN: Performed by: OPHTHALMOLOGY

## 2024-12-02 PROCEDURE — 6360000002 HC RX W HCPCS: Performed by: ANESTHESIOLOGY

## 2024-12-02 PROCEDURE — 2580000003 HC RX 258: Performed by: ANESTHESIOLOGY

## 2024-12-02 PROCEDURE — 6360000002 HC RX W HCPCS: Performed by: OPHTHALMOLOGY

## 2024-12-02 PROCEDURE — V2632 POST CHMBR INTRAOCULAR LENS: HCPCS | Performed by: OPHTHALMOLOGY

## 2024-12-02 PROCEDURE — 3600000012 HC SURGERY LEVEL 2 ADDTL 15MIN: Performed by: OPHTHALMOLOGY

## 2024-12-02 PROCEDURE — 3700000000 HC ANESTHESIA ATTENDED CARE: Performed by: OPHTHALMOLOGY

## 2024-12-02 PROCEDURE — 3700000001 HC ADD 15 MINUTES (ANESTHESIA): Performed by: OPHTHALMOLOGY

## 2024-12-02 PROCEDURE — 2500000003 HC RX 250 WO HCPCS: Performed by: OPHTHALMOLOGY

## 2024-12-02 PROCEDURE — 2580000003 HC RX 258: Performed by: OPHTHALMOLOGY

## 2024-12-02 PROCEDURE — 2709999900 HC NON-CHARGEABLE SUPPLY: Performed by: OPHTHALMOLOGY

## 2024-12-02 PROCEDURE — 7100000010 HC PHASE II RECOVERY - FIRST 15 MIN: Performed by: OPHTHALMOLOGY

## 2024-12-02 PROCEDURE — 3600000002 HC SURGERY LEVEL 2 BASE: Performed by: OPHTHALMOLOGY

## 2024-12-02 DEVICE — STERILE UV AND BLUE LIGHT FILTERING ACRYLIC FOLDABLE ASPHERIC POSTERIOR CHAMBER INTRAOCULAR LENS
Type: IMPLANTABLE DEVICE | Site: EYE | Status: FUNCTIONAL
Brand: CLAREON

## 2024-12-02 RX ORDER — PROPARACAINE HYDROCHLORIDE 5 MG/ML
1 SOLUTION/ DROPS OPHTHALMIC EVERY 5 MIN PRN
Status: COMPLETED | OUTPATIENT
Start: 2024-12-02 | End: 2024-12-02

## 2024-12-02 RX ORDER — SODIUM CHLORIDE 0.9 % (FLUSH) 0.9 %
5-40 SYRINGE (ML) INJECTION EVERY 12 HOURS SCHEDULED
Status: DISCONTINUED | OUTPATIENT
Start: 2024-12-02 | End: 2024-12-02 | Stop reason: HOSPADM

## 2024-12-02 RX ORDER — SODIUM CHLORIDE 9 MG/ML
INJECTION, SOLUTION INTRAVENOUS CONTINUOUS
Status: DISCONTINUED | OUTPATIENT
Start: 2024-12-02 | End: 2024-12-02 | Stop reason: HOSPADM

## 2024-12-02 RX ORDER — LIDOCAINE HYDROCHLORIDE 10 MG/ML
1 INJECTION, SOLUTION EPIDURAL; INFILTRATION; INTRACAUDAL; PERINEURAL
Status: COMPLETED | OUTPATIENT
Start: 2024-12-02 | End: 2024-12-02

## 2024-12-02 RX ORDER — CYCLOPENTOLATE HYDROCHLORIDE 10 MG/ML
1 SOLUTION/ DROPS OPHTHALMIC PRN
Status: COMPLETED | OUTPATIENT
Start: 2024-12-02 | End: 2024-12-02

## 2024-12-02 RX ORDER — SODIUM CHLORIDE 0.9 % (FLUSH) 0.9 %
5-40 SYRINGE (ML) INJECTION PRN
Status: DISCONTINUED | OUTPATIENT
Start: 2024-12-02 | End: 2024-12-02 | Stop reason: HOSPADM

## 2024-12-02 RX ORDER — LIDOCAINE HYDROCHLORIDE 10 MG/ML
INJECTION, SOLUTION EPIDURAL; INFILTRATION; INTRACAUDAL; PERINEURAL PRN
Status: DISCONTINUED | OUTPATIENT
Start: 2024-12-02 | End: 2024-12-02 | Stop reason: ALTCHOICE

## 2024-12-02 RX ORDER — SODIUM CHLORIDE 9 MG/ML
INJECTION, SOLUTION INTRAVENOUS PRN
Status: DISCONTINUED | OUTPATIENT
Start: 2024-12-02 | End: 2024-12-02 | Stop reason: HOSPADM

## 2024-12-02 RX ORDER — PREDNISOLONE ACETATE 10 MG/ML
SUSPENSION/ DROPS OPHTHALMIC PRN
Status: DISCONTINUED | OUTPATIENT
Start: 2024-12-02 | End: 2024-12-02 | Stop reason: ALTCHOICE

## 2024-12-02 RX ORDER — KETOROLAC TROMETHAMINE 5 MG/ML
1 SOLUTION OPHTHALMIC
Status: COMPLETED | OUTPATIENT
Start: 2024-12-02 | End: 2024-12-02

## 2024-12-02 RX ORDER — CIPROFLOXACIN HYDROCHLORIDE 3.5 MG/ML
SOLUTION/ DROPS TOPICAL PRN
Status: DISCONTINUED | OUTPATIENT
Start: 2024-12-02 | End: 2024-12-02 | Stop reason: ALTCHOICE

## 2024-12-02 RX ORDER — TETRACAINE HYDROCHLORIDE 5 MG/ML
SOLUTION OPHTHALMIC PRN
Status: DISCONTINUED | OUTPATIENT
Start: 2024-12-02 | End: 2024-12-02 | Stop reason: ALTCHOICE

## 2024-12-02 RX ORDER — MIDAZOLAM HYDROCHLORIDE 1 MG/ML
INJECTION, SOLUTION INTRAMUSCULAR; INTRAVENOUS
Status: DISCONTINUED | OUTPATIENT
Start: 2024-12-02 | End: 2024-12-02 | Stop reason: SDUPTHER

## 2024-12-02 RX ADMIN — MIDAZOLAM 0.5 MG: 1 INJECTION INTRAMUSCULAR; INTRAVENOUS at 08:25

## 2024-12-02 RX ADMIN — CYCLOPENTOLATE HYDROCHLORIDE 1 DROP: 10 SOLUTION OPHTHALMIC at 07:36

## 2024-12-02 RX ADMIN — Medication 7 ML: at 08:21

## 2024-12-02 RX ADMIN — Medication 1 DROP: at 07:44

## 2024-12-02 RX ADMIN — MIDAZOLAM 1 MG: 1 INJECTION INTRAMUSCULAR; INTRAVENOUS at 08:21

## 2024-12-02 RX ADMIN — LIDOCAINE HYDROCHLORIDE 1 ML: 10 INJECTION, SOLUTION EPIDURAL; INFILTRATION; INTRACAUDAL; PERINEURAL at 08:01

## 2024-12-02 RX ADMIN — Medication 1 DROP: at 07:35

## 2024-12-02 RX ADMIN — CYCLOPENTOLATE HYDROCHLORIDE 1 DROP: 10 SOLUTION OPHTHALMIC at 07:50

## 2024-12-02 RX ADMIN — CYCLOPENTOLATE HYDROCHLORIDE 1 DROP: 10 SOLUTION OPHTHALMIC at 07:43

## 2024-12-02 RX ADMIN — Medication 1 DROP: at 07:50

## 2024-12-02 RX ADMIN — SODIUM CHLORIDE, PRESERVATIVE FREE 10 ML: 5 INJECTION INTRAVENOUS at 08:00

## 2024-12-02 RX ADMIN — KETOROLAC TROMETHAMINE 1 DROP: 0.5 SOLUTION OPHTHALMIC at 07:35

## 2024-12-02 ASSESSMENT — ENCOUNTER SYMPTOMS
GASTROINTESTINAL NEGATIVE: 1
TROUBLE SWALLOWING: 0
ABDOMINAL PAIN: 0
APNEA: 1
SHORTNESS OF BREATH: 0
COUGH: 0
SORE THROAT: 0
BACK PAIN: 0

## 2024-12-02 ASSESSMENT — PAIN - FUNCTIONAL ASSESSMENT
PAIN_FUNCTIONAL_ASSESSMENT: NONE - DENIES PAIN
PAIN_FUNCTIONAL_ASSESSMENT: 0-10

## 2024-12-02 NOTE — ANESTHESIA POSTPROCEDURE EVALUATION
Department of Anesthesiology  Postprocedure Note    Patient: Cb Nicholson  MRN: [id number]  YOB: 1960  Date of evaluation: 12/2/2024    Procedure Summary       Date: 12/02/24 Room / Location: [address]    Anesthesia Start: 0819 Anesthesia Stop: 0905    Procedure: EYE CATARACT EMULSIFICATION INTRAOCULAR LENS IMPLANT LEFT (Left: Eye) Diagnosis:       Cortical age-related cataract of left eye      (Cortical age-related cataract of left eye [H25.012])    Surgeons: Jose Miguel Jarrett MD Responsible Provider: Abigail Weir MD    Anesthesia Type: MAC ASA Status: 3            Anesthesia Type: No value filed.    Brigid Phase I: Brigid Score: 10    Brigid Phase II: Brigid Score: 10    Anesthesia Post Evaluation    Comments: POST- ANESTHESIA EVALUATION       Pt Name: Cb Nicholson  MRN: [id number]  YOB: 1960  Date of evaluation: 12/2/2024  Time:  2:09 PM      /82   Pulse 60   Temp 97 °F (36.1 °C) (Infrared)   Resp 16   Ht 1.778 m (5' 10\")   Wt 90.7 kg (200 lb)   SpO2 98%   BMI 28.70 kg/m²      Consciousness Level  Awake  Cardiopulmonary Status  Stable  Pain Adequately Treated YES  Nausea / Vomiting  NO  Adequate Hydration  YES  Anesthesia Related Complications NONE      Electronically signed by Abigail Weir MD on 12/2/2024 at 2:09 PM           No notable events documented.

## 2024-12-02 NOTE — ANESTHESIA PRE PROCEDURE
Department of Anesthesiology  Preprocedure Note       Name:  Cb Nicholson   Age:  64 y.o.  :  1960                                          MRN:  [id number]         Date:  2024      Surgeon: Surgeon(s):  Jose Miguel Jarrett MD    Procedure: Procedure(s):  EYE CATARACT EMULSIFICATION INTRAOCULAR LENS IMPLANT LEFT    Medications prior to admission:   Prior to Admission medications    Medication Sig Start Date End Date Taking? Authorizing Provider   allopurinol (ZYLOPRIM) 100 MG tablet TAKE TWO TABLETS BY MOUTH ONCE A DAY 24  Yes Jonathan Lebron MD   ticagrelor (BRILINTA) 60 MG TABS tablet Take 1 tablet by mouth 2 times daily 24 Yes Pancho Batista MD   atorvastatin (LIPITOR) 80 MG tablet Take 1 tablet by mouth nightly 24  Yes Pancho Batista MD   carvedilol (COREG) 3.125 MG tablet Take 1 tablet by mouth 2 times daily 24  Yes Brian Gaytan DO   lisinopril (PRINIVIL;ZESTRIL) 2.5 MG tablet TAKE ONE-HALF TABLET BY MOUTH ONCE A DAY 24  Yes Brian Gaytan DO   amLODIPine (NORVASC) 2.5 MG tablet Take 1 tablet by mouth daily 24  Yes Deysi Peck, APRN - CNP   gemfibrozil (LOPID) 600 MG tablet TAKE ONE TABLET BY MOUTH TWICE A DAY 24  Yes Pancho Batista MD   nitroGLYCERIN (NITROSTAT) 0.4 MG SL tablet DISSOLVE 1 TABLET UNDER TONGUE FOR CHEST PAIN - IF PAIN REMAINS AFTER 5 MIN, CALL 911 AND REPEAT DOSE. MAX 3 TABLETS IN 15 MINUTES *GO TO EMERGENCY ROOM AFTER 3 DOSES* 10/9/23  Yes Vicente Marina MD   Coenzyme Q10 10 MG CAPS Take 10 mg by mouth daily   Yes Doretha Dial MD   Cholecalciferol (VITAMIN D3) 5000 units TABS Take 1 tablet by mouth daily   Yes Doretha Dial MD   vitamin C (ASCORBIC ACID) 500 MG tablet Take 1 tablet by mouth daily   Yes Doretha Dial MD       Current medications:    Current Facility-Administered Medications   Medication Dose Route Frequency Provider Last Rate Last Admin   • lidocaine PF 1 % injection 1 mL  1 mL

## 2024-12-02 NOTE — H&P
HISTORY and PHYSICAL  [facility]       NAME:  Cb Nicholson  MRN: [id number]   YOB: 1960   Date: 12/2/2024   Age: 64 y.o.  Gender: male       COMPLAINT AND PRESENT HISTORY:     Cb Nicholson is 64 y.o.,  male, presents for EYE CATARACT EMULSIFICATION INTRAOCULAR LENS IMPLANT LEFT   Primary dx: Cortical age-related cataract of left eye [H25.012].    HPI    Cb Nicholson is 64 y.o.,  male, here for   Cortical age-related cataract of left eye [H25.012].    Pt will be having EYE CATARACT EMULSIFICATION INTRAOCULAR LENS IMPLANT LEFT per Dr Jarrett done today .     Pt  Lasix previous eye surgery.  Done 2000    Pt  clouded, blurred or dim vision, trouble seeing at night, sensitivity to light and glare,  seeing \"halos\" around lights.     Pt  denies  eye pain, redness, itching or drainage.    Pt not wearing corrective lens.     Pt denies family history of glaucoma or blindness.    Medical history reviewed.  CAD  stent 2018.  HTN, Sleep apnea CPAP.  Follows with Dr Batista   Last visit 7/2/2024    Pt denies recent or current chest pain/pressure, palpitations, SOB, recent URI, fever or chills.     NPO status: since [place]  Medications taken TODAY (with sip of water): Norvasc, Coreg  Anticoagulation status:  Brilinta Last dose 11/29/2024    Denies personal hx of blood clots.  Denies personal hx of MRSA infection.  Denies any personal or family hx of previous complications w/anesthesia.    PAST MEDICAL HISTORY     Past Medical History:   Diagnosis Date    CAD (coronary artery disease)     5/10/2021 cath Dr. Marina/ [facility]/ last seen 8-2021    Cancer (HCC)     basal cell just inside right nares    COVID-19 12/29/2021    dry cough, sneezing, headache    Enlarged prostate 2016    Family history of heart attack     Mother, Father, Sisters    Gout     Heart attack (HCC) 02/2018    High triglycerides     Hyperlipidemia     Hypertension     Sleep apnea       Celso Lindsey MD last seen 1/5/2021/[place]- Cpap

## 2024-12-02 NOTE — OP NOTE
with BSS and approximately 0.5 cc given for small pupil and Flomax use.  Trypan blue staining was used.  This was then replaced with Viscoat. Attention was directed superiorly. A conjunctival peritomy was carried out from the 11 o'clock to 1 o'clock position. Hemostasis was obtained with wet-field cautery. A scratch incision was made at the posterior border of the limbus for approximately 2.75 mm. This was then carried into clear cornea with a crescent blade in tunnel fashion. A 2.75 keratome was then used to enter into the anterior chamber. A bent-needle cystome was used to start a capsulorrhexis. It was finished with Ultrata forceps. Hydrodissection was carried out with BSS. The lens was removed in divide-and-conquer technique with phacoemulsification. Residual cortex was removed with the irrigation/aspiration handpiece. The capsule was polished with a silicone I/A tip. Provisc was then placed into the capsular bag. The SY 60 WF 19.5 IOL was placed without difficulty into the capsular bag and rotated into good position. Residual viscoelastic was removed with irrigation/aspiration handpiece. Anterior chamber was refilled with BSS. The lens was found to be well centered. All wounds were found to be water tight. The conjunctiva was closed with coaptation cautery, and one drop of Prednisolone 1% and Cipro were placed into the eye. The lid speculum and drape were removed and the patient went to recovery in good condition.  Note he had smallish pupil and strength at the end of the case and was floppy at the end after removal of viscoelastic.    Electronically signed by Jose Miguel Jarrett MD on 12/2/2024 at 8:57 AM

## 2024-12-05 DIAGNOSIS — E78.2 MIXED HYPERLIPIDEMIA: Primary | ICD-10-CM

## 2024-12-05 RX ORDER — GEMFIBROZIL 600 MG/1
600 TABLET, FILM COATED ORAL 2 TIMES DAILY
Qty: 180 TABLET | Refills: 3 | Status: SHIPPED | OUTPATIENT
Start: 2024-12-05

## 2024-12-05 NOTE — TELEPHONE ENCOUNTER
Gemfibrozil (LOPID) 600MG tablet    [facility] - [place], [place] - [address], [address] - P [phone] - f [phone] [[id number]]   
No indicators present

## 2024-12-13 ENCOUNTER — ANESTHESIA EVENT (OUTPATIENT)
Dept: OPERATING ROOM | Age: 64
End: 2024-12-13
Payer: COMMERCIAL

## 2024-12-16 ENCOUNTER — ANESTHESIA (OUTPATIENT)
Dept: OPERATING ROOM | Age: 64
End: 2024-12-16
Payer: COMMERCIAL

## 2024-12-16 ENCOUNTER — HOSPITAL ENCOUNTER (OUTPATIENT)
Age: 64
Setting detail: OUTPATIENT SURGERY
Discharge: HOME | End: 2024-12-16
Attending: OPHTHALMOLOGY | Admitting: OPHTHALMOLOGY
Payer: COMMERCIAL

## 2024-12-16 VITALS
BODY MASS INDEX: 28.63 KG/M2 | OXYGEN SATURATION: 97 % | TEMPERATURE: 98.4 F | WEIGHT: 200 LBS | RESPIRATION RATE: 18 BRPM | HEIGHT: 70 IN | SYSTOLIC BLOOD PRESSURE: 127 MMHG | DIASTOLIC BLOOD PRESSURE: 88 MMHG | HEART RATE: 61 BPM

## 2024-12-16 PROCEDURE — 2500000003 HC RX 250 WO HCPCS: Performed by: OPHTHALMOLOGY

## 2024-12-16 PROCEDURE — 3700000001 HC ADD 15 MINUTES (ANESTHESIA): Performed by: OPHTHALMOLOGY

## 2024-12-16 PROCEDURE — 3600000002 HC SURGERY LEVEL 2 BASE: Performed by: OPHTHALMOLOGY

## 2024-12-16 PROCEDURE — 6360000002 HC RX W HCPCS: Performed by: OPHTHALMOLOGY

## 2024-12-16 PROCEDURE — 6370000000 HC RX 637 (ALT 250 FOR IP): Performed by: OPHTHALMOLOGY

## 2024-12-16 PROCEDURE — V2632 POST CHMBR INTRAOCULAR LENS: HCPCS | Performed by: OPHTHALMOLOGY

## 2024-12-16 PROCEDURE — 7100000010 HC PHASE II RECOVERY - FIRST 15 MIN: Performed by: OPHTHALMOLOGY

## 2024-12-16 PROCEDURE — 3700000000 HC ANESTHESIA ATTENDED CARE: Performed by: OPHTHALMOLOGY

## 2024-12-16 PROCEDURE — 6360000002 HC RX W HCPCS: Performed by: NURSE ANESTHETIST, CERTIFIED REGISTERED

## 2024-12-16 PROCEDURE — 3600000012 HC SURGERY LEVEL 2 ADDTL 15MIN: Performed by: OPHTHALMOLOGY

## 2024-12-16 PROCEDURE — 7100000011 HC PHASE II RECOVERY - ADDTL 15 MIN: Performed by: OPHTHALMOLOGY

## 2024-12-16 PROCEDURE — 2709999900 HC NON-CHARGEABLE SUPPLY: Performed by: OPHTHALMOLOGY

## 2024-12-16 DEVICE — LENS CLAREON IOL 19.0D: Type: IMPLANTABLE DEVICE | Status: FUNCTIONAL

## 2024-12-16 RX ORDER — PROPARACAINE HYDROCHLORIDE 5 MG/ML
1 SOLUTION/ DROPS OPHTHALMIC EVERY 5 MIN PRN
Status: COMPLETED | OUTPATIENT
Start: 2024-12-16 | End: 2024-12-16

## 2024-12-16 RX ORDER — SODIUM CHLORIDE 9 MG/ML
INJECTION, SOLUTION INTRAVENOUS PRN
Status: DISCONTINUED | OUTPATIENT
Start: 2024-12-16 | End: 2024-12-16 | Stop reason: HOSPADM

## 2024-12-16 RX ORDER — LIDOCAINE HYDROCHLORIDE 10 MG/ML
1 INJECTION, SOLUTION EPIDURAL; INFILTRATION; INTRACAUDAL; PERINEURAL
Status: DISCONTINUED | OUTPATIENT
Start: 2024-12-16 | End: 2024-12-16 | Stop reason: HOSPADM

## 2024-12-16 RX ORDER — CIPROFLOXACIN HYDROCHLORIDE 3.5 MG/ML
SOLUTION/ DROPS TOPICAL PRN
Status: DISCONTINUED | OUTPATIENT
Start: 2024-12-16 | End: 2024-12-16 | Stop reason: ALTCHOICE

## 2024-12-16 RX ORDER — SODIUM CHLORIDE 0.9 % (FLUSH) 0.9 %
5-40 SYRINGE (ML) INJECTION EVERY 12 HOURS SCHEDULED
Status: DISCONTINUED | OUTPATIENT
Start: 2024-12-16 | End: 2024-12-16 | Stop reason: HOSPADM

## 2024-12-16 RX ORDER — SODIUM CHLORIDE 0.9 % (FLUSH) 0.9 %
5-40 SYRINGE (ML) INJECTION PRN
Status: DISCONTINUED | OUTPATIENT
Start: 2024-12-16 | End: 2024-12-16 | Stop reason: HOSPADM

## 2024-12-16 RX ORDER — MIDAZOLAM HYDROCHLORIDE 1 MG/ML
INJECTION, SOLUTION INTRAMUSCULAR; INTRAVENOUS
Status: DISCONTINUED | OUTPATIENT
Start: 2024-12-16 | End: 2024-12-16 | Stop reason: SDUPTHER

## 2024-12-16 RX ORDER — BALANCED SALT SOLUTION ENRICHED WITH BICARBONATE, DEXTROSE, AND GLUTATHIONE
KIT INTRAOCULAR PRN
Status: DISCONTINUED | OUTPATIENT
Start: 2024-12-16 | End: 2024-12-16 | Stop reason: ALTCHOICE

## 2024-12-16 RX ORDER — TETRACAINE HYDROCHLORIDE 5 MG/ML
SOLUTION OPHTHALMIC PRN
Status: DISCONTINUED | OUTPATIENT
Start: 2024-12-16 | End: 2024-12-16 | Stop reason: ALTCHOICE

## 2024-12-16 RX ORDER — LIDOCAINE HYDROCHLORIDE 10 MG/ML
INJECTION, SOLUTION EPIDURAL; INFILTRATION; INTRACAUDAL; PERINEURAL PRN
Status: DISCONTINUED | OUTPATIENT
Start: 2024-12-16 | End: 2024-12-16 | Stop reason: ALTCHOICE

## 2024-12-16 RX ORDER — SODIUM CHLORIDE 9 MG/ML
INJECTION, SOLUTION INTRAVENOUS CONTINUOUS
Status: DISCONTINUED | OUTPATIENT
Start: 2024-12-16 | End: 2024-12-16 | Stop reason: HOSPADM

## 2024-12-16 RX ORDER — CYCLOPENTOLATE HYDROCHLORIDE 10 MG/ML
1 SOLUTION/ DROPS OPHTHALMIC PRN
Status: COMPLETED | OUTPATIENT
Start: 2024-12-16 | End: 2024-12-16

## 2024-12-16 RX ORDER — KETOROLAC TROMETHAMINE 5 MG/ML
1 SOLUTION OPHTHALMIC
Status: COMPLETED | OUTPATIENT
Start: 2024-12-16 | End: 2024-12-16

## 2024-12-16 RX ORDER — PREDNISOLONE ACETATE 10 MG/ML
SUSPENSION/ DROPS OPHTHALMIC PRN
Status: DISCONTINUED | OUTPATIENT
Start: 2024-12-16 | End: 2024-12-16 | Stop reason: ALTCHOICE

## 2024-12-16 RX ADMIN — Medication 1 DROP: at 09:46

## 2024-12-16 RX ADMIN — CYCLOPENTOLATE HYDROCHLORIDE 1 DROP: 10 SOLUTION OPHTHALMIC at 09:36

## 2024-12-16 RX ADMIN — Medication 1 DROP: at 09:41

## 2024-12-16 RX ADMIN — CYCLOPENTOLATE HYDROCHLORIDE 1 DROP: 10 SOLUTION OPHTHALMIC at 09:41

## 2024-12-16 RX ADMIN — KETOROLAC TROMETHAMINE 1 DROP: 0.5 SOLUTION OPHTHALMIC at 09:36

## 2024-12-16 RX ADMIN — MIDAZOLAM 1 MG: 1 INJECTION INTRAMUSCULAR; INTRAVENOUS at 10:34

## 2024-12-16 RX ADMIN — Medication 1 DROP: at 09:36

## 2024-12-16 RX ADMIN — CYCLOPENTOLATE HYDROCHLORIDE 1 DROP: 10 SOLUTION OPHTHALMIC at 09:46

## 2024-12-16 ASSESSMENT — PAIN - FUNCTIONAL ASSESSMENT: PAIN_FUNCTIONAL_ASSESSMENT: 0-10

## 2024-12-16 ASSESSMENT — ENCOUNTER SYMPTOMS
RESPIRATORY NEGATIVE: 1
GASTROINTESTINAL NEGATIVE: 1

## 2024-12-16 NOTE — H&P
Comment: 2 X weekly 2-3 drinks    Drug use: Never    Sexual activity: Yes     Partners: Female   Social History Narrative    ** Merged History Encounter **          Social Determinants of Health     Financial Resource Strain: Low Risk  (4/2/2024)    Overall Financial Resource Strain (CARDIA)     Difficulty of Paying Living Expenses: Not very hard   Food Insecurity: No Food Insecurity (4/2/2024)    Hunger Vital Sign     Worried About Running Out of Food in the Last Year: Never true     Ran Out of Food in the Last Year: Never true   Transportation Needs: Unknown (4/2/2024)    PRAPARE - Transportation     Lack of Transportation (Non-Medical): No   Housing Stability: Unknown (4/2/2024)    Housing Stability Vital Sign     Unstable Housing in the Last Year: No           REVIEW OF SYSTEMS      Allergies   Allergen Reactions    Morphine Rash       No current facility-administered medications on file prior to encounter.     Current Outpatient Medications on File Prior to Encounter   Medication Sig Dispense Refill    allopurinol (ZYLOPRIM) 100 MG tablet TAKE TWO TABLETS BY MOUTH ONCE A  tablet 0    ticagrelor (BRILINTA) 60 MG TABS tablet Take 1 tablet by mouth 2 times daily 180 tablet 2    atorvastatin (LIPITOR) 80 MG tablet Take 1 tablet by mouth nightly 90 tablet 5    carvedilol (COREG) 3.125 MG tablet Take 1 tablet by mouth 2 times daily 180 tablet 3    lisinopril (PRINIVIL;ZESTRIL) 2.5 MG tablet TAKE ONE-HALF TABLET BY MOUTH ONCE A DAY 45 tablet 3    amLODIPine (NORVASC) 2.5 MG tablet Take 1 tablet by mouth daily 30 tablet 9    nitroGLYCERIN (NITROSTAT) 0.4 MG SL tablet DISSOLVE 1 TABLET UNDER TONGUE FOR CHEST PAIN - IF PAIN REMAINS AFTER 5 MIN, CALL 911 AND REPEAT DOSE. MAX 3 TABLETS IN 15 MINUTES *GO TO EMERGENCY ROOM AFTER 3 DOSES* 25 tablet 0    Coenzyme Q10 10 MG CAPS Take 10 mg by mouth daily      Cholecalciferol (VITAMIN D3) 5000 units TABS Take 1 tablet by mouth daily      vitamin C (ASCORBIC ACID) 500 MG

## 2024-12-16 NOTE — ANESTHESIA PRE PROCEDURE
02/10/2023    COLONOSCOPY WITH BIOPSY performed by Joe Gaytan MD at [facility] [place] OR   • CORONARY ANGIOPLASTY  2018    Stents x2 to LAD in [place]   • CYSTOSCOPY N/A 01/19/2022    CYSTOSCOPY TRANSURETHRAL INCISION BLADDER NECK CONTRACTURE  (KRISHNAN KNIFE) performed by Faustino Dennis MD at [facility] OR   • EYE SURGERY Left 12/2/2024    EYE CATARACT EMULSIFICATION INTRAOCULAR LENS IMPLANT LEFT performed by Jose Miguel Jarrett MD at [facility] OR   • INGUINAL HERNIA REPAIR Bilateral '85, '93   • INGUINAL HERNIA REPAIR Bilateral    • KNEE ARTHROSCOPY  2012   • OTHER SURGICAL HISTORY  01/19/2022    CYSTOSCOPY TRANSURETHRAL INCISION BLADDER NECK CONTRACTURE     • SEPTOPLASTY Bilateral 01/16/2017   • SHOULDER ARTHROSCOPY Left 1995 ?   • SKIN BIOPSY Right     just inside right nares   • TURBINATE RESECTION Bilateral 01/16/2017   • TURP N/A 08/24/2021    CYSTOSCOPY GREEN LIGHT LASER OF PROSTATE - FORTEC performed by Faustino Dennis MD at [facility] [place] OR       Social History:    Social History     Tobacco Use   • Smoking status: Never   • Smokeless tobacco: Never   Substance Use Topics   • Alcohol use: Yes     Comment: 2 X weekly 2-3 drinks                                Counseling given: Not Answered      Vital Signs (Current):   Vitals:    12/16/24 0923   BP: 127/88   Pulse: 61   Resp: 18   Temp: 98.4 °F (36.9 °C)   TempSrc: Infrared   SpO2: 97%   Weight: 90.7 kg (200 lb)   Height: 1.778 m (5' 10\")                                              BP Readings from Last 3 Encounters:   12/16/24 127/88   12/02/24 117/82   09/17/24 112/80       NPO Status: Time of last liquid consumption: 2200                        Time of last solid consumption: 1900                        Date of last liquid consumption: 12/15/24                        Date of last solid food consumption: 12/15/24    BMI:   Wt Readings from Last 3 Encounters:   12/16/24 90.7 kg (200 lb)   12/02/24 90.7 kg (200 lb)   11/29/24 90.7 kg (200 lb)     Body mass index is 28.7 kg/m².    CBC:   Lab

## 2024-12-16 NOTE — ANESTHESIA POSTPROCEDURE EVALUATION
Department of Anesthesiology  Postprocedure Note    Patient: Cb Nicholson  MRN: [id number]  YOB: 1960  Date of evaluation: 12/16/2024    Procedure Summary       Date: 12/16/24 Room / Location: [address]    Anesthesia Start: 1033 Anesthesia Stop: 1100    Procedure: EYE CATARACT EMULSIFICATION INTRAOCULAR LENS IMPLANT RIGHT (Right: Eye) Diagnosis:       Nuclear sclerotic cataract of right eye      (Nuclear sclerotic cataract of right eye [H25.11])    Surgeons: Jose Miguel Jarrett MD Responsible Provider: Omkar Ponce MD    Anesthesia Type: MAC ASA Status: 3            Anesthesia Type: No value filed.    Brigid Phase I:      Brigid Phase II: Brigid Score: 10    Anesthesia Post Evaluation    Patient location during evaluation: PACU  Patient participation: complete - patient participated  Level of consciousness: awake and alert  Airway patency: patent  Nausea & Vomiting: no vomiting  Cardiovascular status: hemodynamically stable  Respiratory status: acceptable  Hydration status: euvolemic  Comments: POST- ANESTHESIA EVALUATION       Pt Name: Cb Nicholson  MRN: [id number]  YOB: 1960  Date of evaluation: 12/16/2024  Time:  3:13 PM      /88   Pulse 61   Temp 98.4 °F (36.9 °C) (Infrared)   Resp 18   Ht 1.778 m (5' 10\")   Wt 90.7 kg (200 lb)   SpO2 97%   BMI 28.70 kg/m²      Consciousness Level  Awake  Cardiopulmonary Status  Stable  Pain Adequately Treated YES  Nausea / Vomiting  NO  Adequate Hydration  YES  Anesthesia Related Complications NONE      Electronically signed by Omkar Ponce MD on 12/16/2024 at 3:13 PM         Pain management: satisfactory to patient    No notable events documented.

## 2024-12-16 NOTE — OP NOTE
Operative Note      Patient: Cb Nicholson  YOB: 1960  MRN: [id number]    Date of Procedure: 12/16/2024    Pre-Op Diagnosis Codes:      * Nuclear sclerotic cataract of right eye [H25.11]    Post-Op Diagnosis: Same       Procedure(s):  EYE CATARACT EMULSIFICATION INTRAOCULAR LENS IMPLANT RIGHT    Surgeon(s):  Jose Miguel Jarrett MD    Assistant:   * No surgical staff found *    Anesthesia: Monitor Anesthesia Care    Estimated Blood Loss (mL): Minimal    Complications: None    Specimens:   * No specimens in log *    Implants:  * No implants in log *      Drains: * No LDAs found *    Findings:  Infection Present At Time Of Surgery (PATOS) (choose all levels that have infection present):  No infection present  Other Findings: Cataract    Detailed Description of Procedure:   This is a 64-year-old gentleman having increasing difficulty with daily activities secondary to poor vision in his right eye.  He has best corrected vision of 20/25 however he has a lot of glare and fog difficulty driving in the dark with bright lights with glare he drops to 20/100.  He has 2+ ASC 2+ PSC and 1+ NS in his right eye.  He appears understand the risk benefits alternatives of cataract surgery in his right eye and wishes to proceed.The patient was brought back into the operating suite, placed in supine position, prepped and draped in usual standard fashion. A lid speculum was placed into the right eye. Approximately 0.3 cc of plain nonpreserved lidocaine was placed onto the eye topically. A paracentesis tract was made at the 2 o'clock position with an eye knife. Then, 0.3 cc of 1% non-preserved lidocaine was placed into the anterior chamber. This was then replaced with Viscoat. Attention was directed superiorly. A conjunctival peritomy was carried out from the 11 o'clock to 1 o'clock position. Hemostasis was obtained with wet-field cautery. A scratch incision was made at the posterior border of the limbus for approximately 2.75

## 2025-01-02 ENCOUNTER — OFFICE VISIT (OUTPATIENT)
Age: 65
End: 2025-01-02
Payer: COMMERCIAL

## 2025-01-02 VITALS
WEIGHT: 202 LBS | DIASTOLIC BLOOD PRESSURE: 80 MMHG | HEIGHT: 70 IN | HEART RATE: 82 BPM | OXYGEN SATURATION: 93 % | SYSTOLIC BLOOD PRESSURE: 113 MMHG | BODY MASS INDEX: 28.92 KG/M2

## 2025-01-02 DIAGNOSIS — I10 ESSENTIAL HYPERTENSION: ICD-10-CM

## 2025-01-02 DIAGNOSIS — I25.10 CORONARY ARTERY DISEASE INVOLVING NATIVE CORONARY ARTERY OF NATIVE HEART WITHOUT ANGINA PECTORIS: Primary | ICD-10-CM

## 2025-01-02 PROCEDURE — 3074F SYST BP LT 130 MM HG: CPT | Performed by: INTERNAL MEDICINE

## 2025-01-02 PROCEDURE — 3079F DIAST BP 80-89 MM HG: CPT | Performed by: INTERNAL MEDICINE

## 2025-01-02 PROCEDURE — 93000 ELECTROCARDIOGRAM COMPLETE: CPT | Performed by: INTERNAL MEDICINE

## 2025-01-02 PROCEDURE — 99214 OFFICE O/P EST MOD 30 MIN: CPT | Performed by: INTERNAL MEDICINE

## 2025-01-02 NOTE — PROGRESS NOTES
LDL 98.  Plan to repeat fasting lipid panel in September 2024.  Dietary modifications extensively discussed with him.  If LDL remains greater than 90 on next check, I plan on adding Zetia.  Continue gemfibrozil for hypertriglyceridemia    HTN: under control. Same medications for now. Ambulatory BP measurement is important      The patient was counseled regarding heart healthy diet, weight loss and exercise as tolerated. Patient's medications and side effects were discussed. All questions and concerns were addressed to patient's satisfaction.     The patient is to follow up in 6 months or sooner if necessary.     Thank you for allowing me to participate in the care of this patient, please do not hesitate to call if you have any questions.      Nadiya Batista MD, FACC, [facility]

## 2025-01-15 ENCOUNTER — TELEPHONE (OUTPATIENT)
Age: 65
End: 2025-01-15

## 2025-01-15 NOTE — TELEPHONE ENCOUNTER
Patient called and stated he has been experiencing slight chest discomfort on left side of chest.  He was recently seen along with EKG.  Patient stated he works out as normal and did shovel some snow without difficulty.  Stated te discomfort comes during relaxation while watching TV and rest. Patient stated discomfort is slight and like a spasm.  Stated it happens 2-3 times and resolves and has been going on fot the last several days.

## 2025-01-17 ENCOUNTER — TELEPHONE (OUTPATIENT)
Age: 65
End: 2025-01-17

## 2025-01-17 RX ORDER — ISOSORBIDE MONONITRATE 30 MG/1
TABLET, EXTENDED RELEASE ORAL
Qty: 30 TABLET | Refills: 3 | Status: SHIPPED | OUTPATIENT
Start: 2025-01-17

## 2025-01-17 RX ORDER — NITROGLYCERIN 0.4 MG/1
TABLET SUBLINGUAL
Qty: 25 TABLET | Refills: 0 | Status: SHIPPED | OUTPATIENT
Start: 2025-01-17

## 2025-01-17 NOTE — TELEPHONE ENCOUNTER
Returned call to change to Isosorbide 10mg take  1.5 tablet by mouth daily.  Request sent to Dr Batista.

## 2025-01-17 NOTE — TELEPHONE ENCOUNTER
Mindi at [facility] is requesting a call  and can be reached at [phone] . Okay to leave a message.

## 2025-01-17 NOTE — TELEPHONE ENCOUNTER
Received call from pharmacist Imdur extended release cannot be split.  Okay to switch to regulat 10mg 1.5 tablet daily.  Please advise

## 2025-01-17 NOTE — TELEPHONE ENCOUNTER
Patient returned call and agreed to begin Imdur.  Patient also requested refill for NTG be sent to [facility]. Patient will continue to monitor for symptoms and call our office with update.

## 2025-01-18 RX ORDER — ISOSORBIDE DINITRATE 10 MG/1
TABLET ORAL
Qty: 90 TABLET | Refills: 3 | Status: SHIPPED | OUTPATIENT
Start: 2025-01-18

## 2025-01-22 RX ORDER — AMLODIPINE BESYLATE 2.5 MG/1
2.5 TABLET ORAL DAILY
Qty: 90 TABLET | Refills: 3 | Status: SHIPPED | OUTPATIENT
Start: 2025-01-22

## 2025-03-31 DIAGNOSIS — I25.10 CORONARY ARTERY DISEASE INVOLVING NATIVE CORONARY ARTERY OF NATIVE HEART WITHOUT ANGINA PECTORIS: Primary | ICD-10-CM

## 2025-03-31 NOTE — TELEPHONE ENCOUNTER
Cb Nicholson is requesting a refill on Brilinta .    Last Office  visit: 1/02/2025  Next Office visit:  7/17/2025  Last prescribing provider:  7/8/2024 Pancho Batista MD

## 2025-06-11 RX ORDER — ATORVASTATIN CALCIUM 80 MG/1
80 TABLET, FILM COATED ORAL NIGHTLY
Qty: 90 TABLET | Refills: 5 | Status: SHIPPED | OUTPATIENT
Start: 2025-06-11

## 2025-06-23 RX ORDER — LISINOPRIL 5 MG/1
2.5 TABLET ORAL DAILY
Qty: 45 TABLET | Refills: 3 | Status: SHIPPED | OUTPATIENT
Start: 2025-06-23

## 2025-06-23 RX ORDER — CARVEDILOL 3.12 MG/1
3.12 TABLET ORAL 2 TIMES DAILY
Qty: 60 TABLET | Refills: 3 | Status: SHIPPED | OUTPATIENT
Start: 2025-06-23

## 2025-06-25 ENCOUNTER — HOSPITAL ENCOUNTER (OUTPATIENT)
Facility: CLINIC | Age: 65
Discharge: HOME | End: 2025-06-25
Payer: COMMERCIAL

## 2025-06-25 LAB — PSA SERPL-MCNC: 1.17 NG/ML (ref 0–4)

## 2025-06-25 PROCEDURE — 36415 COLL VENOUS BLD VENIPUNCTURE: CPT

## 2025-06-25 PROCEDURE — 84153 ASSAY OF PSA TOTAL: CPT

## 2025-07-07 RX ORDER — LISINOPRIL 2.5 MG/1
TABLET ORAL
Qty: 90 TABLET | Refills: 3 | Status: SHIPPED | OUTPATIENT
Start: 2025-07-07

## 2025-07-07 NOTE — TELEPHONE ENCOUNTER
Cb Nicholson is requesting a refill on Lisinopril 25 mg .   Last Office  visit: 1/2/25  Next Office visit:  7/17/2025  Rx of lisinopril 5 mg was send 6/23/2025. Patient take  0.5 of 25 mg

## 2025-07-17 ENCOUNTER — OFFICE VISIT (OUTPATIENT)
Age: 65
End: 2025-07-17
Payer: COMMERCIAL

## 2025-07-17 VITALS
DIASTOLIC BLOOD PRESSURE: 78 MMHG | OXYGEN SATURATION: 95 % | SYSTOLIC BLOOD PRESSURE: 114 MMHG | HEART RATE: 76 BPM | BODY MASS INDEX: 28.63 KG/M2 | HEIGHT: 70 IN | WEIGHT: 200 LBS

## 2025-07-17 DIAGNOSIS — I20.9 ANGINA PECTORIS: ICD-10-CM

## 2025-07-17 DIAGNOSIS — I21.9 ACUTE MYOCARDIAL INFARCTION, UNSPECIFIED MI TYPE, UNSPECIFIED ARTERY (HCC): Primary | ICD-10-CM

## 2025-07-17 PROCEDURE — 3074F SYST BP LT 130 MM HG: CPT | Performed by: INTERNAL MEDICINE

## 2025-07-17 PROCEDURE — 3078F DIAST BP <80 MM HG: CPT | Performed by: INTERNAL MEDICINE

## 2025-07-17 PROCEDURE — 93000 ELECTROCARDIOGRAM COMPLETE: CPT | Performed by: INTERNAL MEDICINE

## 2025-07-17 PROCEDURE — 99214 OFFICE O/P EST MOD 30 MIN: CPT | Performed by: INTERNAL MEDICINE

## 2025-07-17 NOTE — PROGRESS NOTES
Cardiology Office Follow up           CC: Patient is here care for follow up for CAD     HPI  Cb Nicholson has significant history of acute MI in February 2018 when he was at [place] status post emergent PCI to LAD.  Has been stable since.  He has had no significant decline in functional capacity.  He is still exercising 2-3 times a week without any significant symptoms.  He however reports episodes of spasm-like feeling in the left upper chest without any radiation which lasts several minutes and comes and goes.  This primarily occurs when he is watching TV.  Has no relation with exercise.  Denies any dyspnea, orthopnea, lower extremity edema otherwise      Past Medical:  1. S/P Acute MI , anterior wall Feb 17, 2018. in [facility].  2. Heart Cath suggested 100% LAd, 50% RCA, required TRISH.  3. Ischemic cardiomyopathy, reported Cath EF 35%.  4. Dyslipidemia  5. Strong FH of CAD.  6. [facility] 6/2018 - LVEF 51%.  7. Stress test 1/2019, small apical ischemia, midl with EF 56%.  8. Stress perfusion 4/2021: Apical moderate ischemia ([place]).  9. Heart cath 5/2021: Minimal CAD, worse 40% in LCX /RCA.  10. Carotid scan 4/2022: < 50% bilateral stenosis1  11. Lipid panel 03/2023: Triglycerides 126, LDL 86, HDL 43, cholesterol 154.  Past Medical History:   Diagnosis Date    CAD (coronary artery disease)     5/10/2021 cath Dr. Marina/ [facility]/ last seen 8-2021    Cancer (HCC)     basal cell just inside right nares    COVID-19 12/29/2021    dry cough, sneezing, headache    Enlarged prostate 2016    Family history of heart attack     Mother, Father, Sisters    Gout     Heart attack (HCC) 02/2018    High triglycerides     Hyperlipidemia     Hypertension     Sleep apnea       Celso Lindsey MD last seen 1/5/2021/[facility]- Cpap machine    Wears glasses     Wellness examination     PCP Jonathan Lebron MD/ [facility]/ last seen 8-2021       Past Surgical:  Past Surgical History:   Procedure Laterality Date    CARDIAC CATHETERIZATION  05/10/2021

## 2025-08-11 ENCOUNTER — HOSPITAL ENCOUNTER (OUTPATIENT)
Age: 65
Discharge: HOME | End: 2025-08-13
Attending: INTERNAL MEDICINE
Payer: COMMERCIAL

## 2025-08-11 ENCOUNTER — HOSPITAL ENCOUNTER (OUTPATIENT)
Dept: NUCLEAR MEDICINE | Age: 65
Discharge: HOME | End: 2025-08-13
Attending: INTERNAL MEDICINE
Payer: COMMERCIAL

## 2025-08-11 VITALS — DIASTOLIC BLOOD PRESSURE: 93 MMHG | HEART RATE: 65 BPM | SYSTOLIC BLOOD PRESSURE: 120 MMHG

## 2025-08-11 DIAGNOSIS — I20.9 ANGINA PECTORIS: ICD-10-CM

## 2025-08-11 LAB
STRESS BASELINE DIAS BP: 93 MMHG
STRESS BASELINE HR: 68 BPM
STRESS BASELINE SYS BP: 120 MMHG
STRESS ESTIMATED WORKLOAD: 12.1 METS
STRESS EXERCISE DUR MIN: 10 MIN
STRESS EXERCISE DUR SEC: 16 SEC
STRESS PEAK DIAS BP: 95 MMHG
STRESS PEAK SYS BP: 177 MMHG
STRESS PERCENT HR ACHIEVED: 96 %
STRESS POST PEAK HR: 150 BPM
STRESS RATE PRESSURE PRODUCT: NORMAL BPM*MMHG
STRESS ST DEPRESSION: 0 MM
STRESS TARGET HR: 156 BPM

## 2025-08-11 PROCEDURE — 93017 CV STRESS TEST TRACING ONLY: CPT

## 2025-08-11 PROCEDURE — A9500 TC99M SESTAMIBI: HCPCS | Performed by: INTERNAL MEDICINE

## 2025-08-11 PROCEDURE — 2580000003 HC RX 258: Performed by: INTERNAL MEDICINE

## 2025-08-11 PROCEDURE — 78452 HT MUSCLE IMAGE SPECT MULT: CPT

## 2025-08-11 PROCEDURE — 3430000000 HC RX DIAGNOSTIC RADIOPHARMACEUTICAL: Performed by: INTERNAL MEDICINE

## 2025-08-11 RX ORDER — NITROGLYCERIN 0.4 MG/1
0.4 TABLET SUBLINGUAL EVERY 5 MIN PRN
Status: ACTIVE | OUTPATIENT
Start: 2025-08-11 | End: 2025-08-11

## 2025-08-11 RX ORDER — ATROPINE SULFATE 0.1 MG/ML
0.5 INJECTION INTRAVENOUS EVERY 5 MIN PRN
Status: ACTIVE | OUTPATIENT
Start: 2025-08-11 | End: 2025-08-11

## 2025-08-11 RX ORDER — TETRAKIS(2-METHOXYISOBUTYLISOCYANIDE)COPPER(I) TETRAFLUOROBORATE 1 MG/ML
15.6 INJECTION, POWDER, LYOPHILIZED, FOR SOLUTION INTRAVENOUS
Status: COMPLETED | OUTPATIENT
Start: 2025-08-11 | End: 2025-08-11

## 2025-08-11 RX ORDER — METOPROLOL TARTRATE 1 MG/ML
5 INJECTION, SOLUTION INTRAVENOUS EVERY 5 MIN PRN
Status: ACTIVE | OUTPATIENT
Start: 2025-08-11 | End: 2025-08-11

## 2025-08-11 RX ORDER — SODIUM CHLORIDE 9 MG/ML
500 INJECTION, SOLUTION INTRAVENOUS CONTINUOUS PRN
Status: ACTIVE | OUTPATIENT
Start: 2025-08-11 | End: 2025-08-11

## 2025-08-11 RX ORDER — SODIUM CHLORIDE 0.9 % (FLUSH) 0.9 %
5-40 SYRINGE (ML) INJECTION PRN
Status: ACTIVE | OUTPATIENT
Start: 2025-08-11 | End: 2025-08-11

## 2025-08-11 RX ORDER — TETRAKIS(2-METHOXYISOBUTYLISOCYANIDE)COPPER(I) TETRAFLUOROBORATE 1 MG/ML
41.5 INJECTION, POWDER, LYOPHILIZED, FOR SOLUTION INTRAVENOUS
Status: COMPLETED | OUTPATIENT
Start: 2025-08-11 | End: 2025-08-11

## 2025-08-11 RX ADMIN — SODIUM CHLORIDE 500 ML: 0.9 INJECTION, SOLUTION INTRAVENOUS at 08:15

## 2025-08-11 RX ADMIN — Medication 15.6 MILLICURIE: at 07:25

## 2025-08-11 RX ADMIN — Medication 41.5 MILLICURIE: at 08:28

## 2025-08-11 ASSESSMENT — PAIN SCALES - GENERAL: PAINLEVEL_OUTOF10: 0

## 2025-08-12 ENCOUNTER — RESULTS FOLLOW-UP (OUTPATIENT)
Age: 65
End: 2025-08-12

## (undated) DEVICE — ADAPTER URO SCP UROLOK LL

## (undated) DEVICE — CLEARCUT® SLIT KNIFE DUAL BEVEL 2.75MM ANGLED: Brand: CLEARCUT®

## (undated) DEVICE — GLOVE SURG SZ 65 THK91MIL LTX FREE SYN POLYISOPRENE

## (undated) DEVICE — THE MONARCH® "B" CARTRIDGE IS A SINGLE-USE POLYPROPYLENE CARTRIDGE FOR POSTERIOR CHAMBER IOL DELIVERY: Brand: MONARCH® II

## (undated) DEVICE — GLOVE SURG SZ 75 CRM LTX FREE POLYISOPRENE POLYMER BEAD ANTI

## (undated) DEVICE — SATINCRESCENT® KNIFE ANGLED BEVEL UP: Brand: SATINCRESCENT®

## (undated) DEVICE — PACK PROCEDURE SURG CYSTO SVMMC LF

## (undated) DEVICE — MICROSURGICAL INSTRUMENT "J" SHAPED CANNULA 27GA: Brand: ALCON

## (undated) DEVICE — MEDICINE CUP, GRADUATED, STER: Brand: MEDLINE

## (undated) DEVICE — GOWN,AURORA,NONREINFORCED,LARGE: Brand: MEDLINE

## (undated) DEVICE — PENCIL ELECSURG BPLR 18 GA DISP

## (undated) DEVICE — TUBING, SUCTION, 1/4" X 12', STRAIGHT: Brand: MEDLINE

## (undated) DEVICE — BASIN EMSIS 700ML GRAPHITE PLAS KID SHP GRAD

## (undated) DEVICE — TOWEL,OR,DSP,ST,WHITE,DLX,2/PK,40PK/CS: Brand: MEDLINE

## (undated) DEVICE — DUAL LUMEN URETERAL CATHETER

## (undated) DEVICE — MICROSURGICAL INSTRUMENT ANTERIOR CHAMBER CANNULA 27GA: Brand: ALCON

## (undated) DEVICE — S-CURVE URETHRAL DILATOR SET WITH AQ, HYDROPHILIC COATING: Brand: S~CURVE

## (undated) DEVICE — DRAINBAG,ANTI-REFLUX TOWER,L/F,2000ML,LL: Brand: MEDLINE

## (undated) DEVICE — SYRINGE MED 50ML LUERLOCK TIP

## (undated) DEVICE — GLOVE EXAM M L95IN FNGR THK35MIL PALM THK24MIL OFF WHT

## (undated) DEVICE — GUIDEWIRE URO L150CM DIA0.035IN STIFF NIT HYDRPHLC STR TIP

## (undated) DEVICE — SOLUTION IRRIGATION BAL SALT SOLUTION 500 ML STRL BSS

## (undated) DEVICE — STRAP,CATHETER,ELASTIC,HOOK&LOOP: Brand: MEDLINE

## (undated) DEVICE — Z INACTIVE USE 2660664 SOLUTION IRRIG 3000ML 0.9% SOD CHL USP UROMATIC PLAS CONT

## (undated) DEVICE — YANKAUER,FLEXIBLE HANDLE,REGLR CAPACITY: Brand: MEDLINE INDUSTRIES, INC.

## (undated) DEVICE — CONE TIP URETERAL CATHETER WITH OPEN-END: Brand: CONE TIP

## (undated) DEVICE — Y-TYPE TUR/BLADDER IRRIGATION SET, REGULATING CLAMP

## (undated) DEVICE — FORCEPS BX L L240CM DIA2.4MM RAD JAW 4 HOT FOR POLYP DISP

## (undated) DEVICE — PACK CATARACT SURGI+CARE CUSTOM

## (undated) DEVICE — MARKER,SKIN,WI/RULER AND LABELS: Brand: MEDLINE

## (undated) DEVICE — LASER SURG W22XH58IN D36IN 475LB SLD STATE FREQ DOUBLED

## (undated) DEVICE — CATHETER F BLLN 30CC 22FR 3 W SPEC HEMA LNG OPN COUDE TIP

## (undated) DEVICE — GOWN,SIRUS,NONRNF,XLN/XL,20/CS: Brand: MEDLINE

## (undated) DEVICE — CORD,CAUTERY,BIPOLAR,STERILE: Brand: MEDLINE

## (undated) DEVICE — ADAPTER TBNG LUER STUB 15 GA INTMED

## (undated) DEVICE — CATHETER,FOLEY,3WAY,SILI-ELAST,20FR,30ML: Brand: MEDLINE

## (undated) DEVICE — SYRINGE OPHTH 0.5ML 0.06% TRYPAN BLU PRELD VISIONBLU

## (undated) DEVICE — CO2 CANNULA,SUPERSOFT, ADLT,7'O2,7'CO2: Brand: MEDLINE

## (undated) DEVICE — DRAPE,REIN 53X77,STERILE: Brand: MEDLINE

## (undated) DEVICE — HF-RESECTION ELECTRODE PLASMANEEDLE NEEDLE, RIGHT-ANGLED, 24 FR., 12°-30°, ESG TURIS: Brand: OLYMPUS

## (undated) DEVICE — GLOVE SURG 7.5 11.7IN BEAD CUF LIGHT BRN SENSICARE LTX FREE

## (undated) DEVICE — SINGLE ACTION PUMPING SYSTEM

## (undated) DEVICE — SWABSTICK MEDICATED 10% POVIDONE IOD PVP TRIPE ANTISEP PREP 3 PER PK

## (undated) DEVICE — GLOVE ORANGE PI 7 1/2   MSG9075

## (undated) DEVICE — GARMENT,MEDLINE,DVT,INT,CALF,MED, GEN2: Brand: MEDLINE

## (undated) DEVICE — SURGICAL PROCEDURE KIT BASIN MAJ SET UP

## (undated) DEVICE — S-CURVE URETHRAL DILATOR WITH AQ, HYDROPHILIC COATING: Brand: S~CURVE

## (undated) DEVICE — Device

## (undated) DEVICE — PLUG,CATHETER,DRAINAGE PROTECTOR,TUBE: Brand: MEDLINE

## (undated) DEVICE — CATHETER,FOLEY,3-WAY,22FR,30ML,100%SILI: Brand: MEDLINE